# Patient Record
Sex: FEMALE | Race: BLACK OR AFRICAN AMERICAN | NOT HISPANIC OR LATINO | Employment: FULL TIME | URBAN - METROPOLITAN AREA
[De-identification: names, ages, dates, MRNs, and addresses within clinical notes are randomized per-mention and may not be internally consistent; named-entity substitution may affect disease eponyms.]

---

## 2023-03-14 DIAGNOSIS — G43.909 MIGRAINE WITHOUT STATUS MIGRAINOSUS, NOT INTRACTABLE, UNSPECIFIED MIGRAINE TYPE: Primary | ICD-10-CM

## 2023-03-14 RX ORDER — RIZATRIPTAN BENZOATE 10 MG/1
10 TABLET, ORALLY DISINTEGRATING ORAL ONCE AS NEEDED
COMMUNITY
End: 2023-03-14 | Stop reason: SDUPTHER

## 2023-03-19 RX ORDER — RIZATRIPTAN BENZOATE 10 MG/1
10 TABLET, ORALLY DISINTEGRATING ORAL ONCE AS NEEDED
Qty: 12 TABLET | Refills: 0 | Status: SHIPPED | OUTPATIENT
Start: 2023-03-19 | End: 2023-04-17

## 2023-04-17 DIAGNOSIS — G43.909 MIGRAINE WITHOUT STATUS MIGRAINOSUS, NOT INTRACTABLE, UNSPECIFIED MIGRAINE TYPE: ICD-10-CM

## 2023-04-17 RX ORDER — RIZATRIPTAN BENZOATE 10 MG/1
10 TABLET, ORALLY DISINTEGRATING ORAL ONCE AS NEEDED
Qty: 9 TABLET | Refills: 1 | Status: SHIPPED | OUTPATIENT
Start: 2023-04-17 | End: 2023-07-12

## 2023-04-27 PROBLEM — F41.8 DEPRESSION WITH ANXIETY: Status: ACTIVE | Noted: 2023-04-27

## 2023-04-27 PROBLEM — N76.0 BACTERIAL VAGINOSIS: Status: RESOLVED | Noted: 2023-04-27 | Resolved: 2023-04-27

## 2023-04-27 PROBLEM — B96.89 BACTERIAL VAGINOSIS: Status: RESOLVED | Noted: 2023-04-27 | Resolved: 2023-04-27

## 2023-04-27 PROBLEM — D25.9 FIBROID, UTERINE: Status: ACTIVE | Noted: 2023-04-27

## 2023-04-27 PROBLEM — R42 DIZZINESS: Status: ACTIVE | Noted: 2023-04-27

## 2023-04-27 PROBLEM — R30.0 DYSURIA: Status: RESOLVED | Noted: 2023-04-27 | Resolved: 2023-04-27

## 2023-04-27 PROBLEM — L72.9 SKIN CYST: Status: ACTIVE | Noted: 2023-04-27

## 2023-04-27 PROBLEM — R53.83 FATIGUE: Status: ACTIVE | Noted: 2023-04-27

## 2023-04-27 PROBLEM — F98.8 ATTENTION DEFICIT DISORDER: Status: ACTIVE | Noted: 2023-04-27

## 2023-04-27 PROBLEM — L30.9 DERMATITIS: Status: ACTIVE | Noted: 2023-04-27

## 2023-04-27 PROBLEM — J01.90 ACUTE SINUSITIS: Status: RESOLVED | Noted: 2023-04-27 | Resolved: 2023-04-27

## 2023-04-27 PROBLEM — E55.9 VITAMIN D DEFICIENCY: Status: ACTIVE | Noted: 2023-04-27

## 2023-04-27 PROBLEM — N94.6 SEVERE DYSMENORRHEA: Status: ACTIVE | Noted: 2023-04-27

## 2023-04-27 PROBLEM — M25.512 ACUTE PAIN OF LEFT SHOULDER: Status: ACTIVE | Noted: 2023-04-27

## 2023-04-27 PROBLEM — M79.10 GENERALIZED MUSCLE ACHE: Status: ACTIVE | Noted: 2023-04-27

## 2023-04-27 PROBLEM — M54.9 BACK PAIN: Status: ACTIVE | Noted: 2023-04-27

## 2023-04-27 PROBLEM — B37.31 YEAST INFECTION OF THE VAGINA: Status: RESOLVED | Noted: 2023-04-27 | Resolved: 2023-04-27

## 2023-04-27 PROBLEM — N93.9 VAGINAL SPOTTING: Status: ACTIVE | Noted: 2023-04-27

## 2023-04-27 PROBLEM — N92.0 MENORRHAGIA WITH REGULAR CYCLE: Status: ACTIVE | Noted: 2023-04-27

## 2023-04-27 PROBLEM — R10.9 ABDOMINAL PAIN: Status: ACTIVE | Noted: 2023-04-27

## 2023-04-27 PROBLEM — G43.909 MIGRAINE HEADACHE: Status: ACTIVE | Noted: 2023-04-27

## 2023-04-27 PROBLEM — M79.644 PAIN OF FINGER OF RIGHT HAND: Status: ACTIVE | Noted: 2023-04-27

## 2023-04-27 PROBLEM — N89.8 VAGINAL ODOR: Status: RESOLVED | Noted: 2023-04-27 | Resolved: 2023-04-27

## 2023-04-28 ENCOUNTER — OFFICE VISIT (OUTPATIENT)
Dept: PRIMARY CARE | Facility: CLINIC | Age: 38
End: 2023-04-28
Payer: COMMERCIAL

## 2023-04-28 VITALS
SYSTOLIC BLOOD PRESSURE: 120 MMHG | OXYGEN SATURATION: 99 % | HEIGHT: 64 IN | DIASTOLIC BLOOD PRESSURE: 64 MMHG | BODY MASS INDEX: 25.3 KG/M2 | WEIGHT: 148.2 LBS | HEART RATE: 71 BPM

## 2023-04-28 DIAGNOSIS — Z13.29 SCREENING FOR ENDOCRINE, METABOLIC AND IMMUNITY DISORDER: Primary | ICD-10-CM

## 2023-04-28 DIAGNOSIS — G89.29 CHRONIC LOW BACK PAIN, UNSPECIFIED BACK PAIN LATERALITY, UNSPECIFIED WHETHER SCIATICA PRESENT: ICD-10-CM

## 2023-04-28 DIAGNOSIS — Z13.228 SCREENING FOR ENDOCRINE, METABOLIC AND IMMUNITY DISORDER: Primary | ICD-10-CM

## 2023-04-28 DIAGNOSIS — T78.40XD ALLERGY, SUBSEQUENT ENCOUNTER: ICD-10-CM

## 2023-04-28 DIAGNOSIS — M54.50 CHRONIC LOW BACK PAIN, UNSPECIFIED BACK PAIN LATERALITY, UNSPECIFIED WHETHER SCIATICA PRESENT: ICD-10-CM

## 2023-04-28 DIAGNOSIS — G43.909 MIGRAINE WITHOUT STATUS MIGRAINOSUS, NOT INTRACTABLE, UNSPECIFIED MIGRAINE TYPE: ICD-10-CM

## 2023-04-28 DIAGNOSIS — Z13.0 SCREENING FOR ENDOCRINE, METABOLIC AND IMMUNITY DISORDER: Primary | ICD-10-CM

## 2023-04-28 PROBLEM — T78.40XA ALLERGIES: Status: ACTIVE | Noted: 2023-04-28

## 2023-04-28 PROCEDURE — 99214 OFFICE O/P EST MOD 30 MIN: CPT | Performed by: PHYSICIAN ASSISTANT

## 2023-04-28 PROCEDURE — 1036F TOBACCO NON-USER: CPT | Performed by: PHYSICIAN ASSISTANT

## 2023-04-28 RX ORDER — IBUPROFEN 600 MG/1
600 TABLET ORAL EVERY 6 HOURS PRN
COMMUNITY

## 2023-04-28 RX ORDER — LIDOCAINE 50 MG/G
1 PATCH TOPICAL DAILY
Qty: 30 PATCH | Refills: 11 | Status: SHIPPED | OUTPATIENT
Start: 2023-04-28 | End: 2024-04-27

## 2023-04-28 RX ORDER — LORATADINE 10 MG/1
1 TABLET ORAL DAILY
COMMUNITY
Start: 2022-01-12 | End: 2023-04-28 | Stop reason: ALTCHOICE

## 2023-04-28 RX ORDER — SCOLOPAMINE TRANSDERMAL SYSTEM 1 MG/1
1 PATCH, EXTENDED RELEASE TRANSDERMAL
COMMUNITY
Start: 2023-01-06

## 2023-04-28 RX ORDER — BENZOYL PEROXIDE 100 MG/ML
LIQUID TOPICAL
COMMUNITY
Start: 2023-01-06 | End: 2023-09-19 | Stop reason: SDUPTHER

## 2023-04-28 RX ORDER — FLUTICASONE PROPIONATE 50 MCG
1 SPRAY, SUSPENSION (ML) NASAL DAILY
Qty: 16 G | Refills: 5 | Status: SHIPPED | OUTPATIENT
Start: 2023-04-28 | End: 2024-04-27

## 2023-04-28 RX ORDER — LORATADINE 10 MG/1
10 TABLET ORAL DAILY
Qty: 30 TABLET | Refills: 11 | Status: SHIPPED | OUTPATIENT
Start: 2023-04-28 | End: 2023-05-25

## 2023-04-28 RX ORDER — HYDROXYZINE PAMOATE 25 MG/1
25 CAPSULE ORAL 2 TIMES DAILY PRN
COMMUNITY
Start: 2022-10-25

## 2023-04-28 RX ORDER — CYCLOBENZAPRINE HCL 5 MG
5 TABLET ORAL NIGHTLY PRN
Qty: 30 TABLET | Refills: 0 | Status: SHIPPED | OUTPATIENT
Start: 2023-04-28 | End: 2023-07-05

## 2023-04-28 RX ORDER — KETOTIFEN FUMARATE 0.35 MG/ML
1 SOLUTION/ DROPS OPHTHALMIC 2 TIMES DAILY
COMMUNITY
Start: 2022-08-15 | End: 2023-04-28 | Stop reason: ALTCHOICE

## 2023-04-28 RX ORDER — TIZANIDINE 4 MG/1
4 TABLET ORAL NIGHTLY
COMMUNITY

## 2023-04-28 ASSESSMENT — PATIENT HEALTH QUESTIONNAIRE - PHQ9
2. FEELING DOWN, DEPRESSED OR HOPELESS: NOT AT ALL
1. LITTLE INTEREST OR PLEASURE IN DOING THINGS: NOT AT ALL
SUM OF ALL RESPONSES TO PHQ9 QUESTIONS 1 AND 2: 0

## 2023-04-28 NOTE — ASSESSMENT & PLAN NOTE
Continue rizatriptan 10 mg as needed.  Called in prescription for Nurtec to begin instead, pending insurance coverage.  Avoid triggers of migraines. Rest, apply ice packs to head, and go in a dark cool room to relax as needed for relief.

## 2023-04-28 NOTE — ASSESSMENT & PLAN NOTE
Rest, apply ice/heat in 20-minute intervals, use topical lidocaine patches in 12-hour intervals (prescription sent to pharmacy).  Prescription sent for cyclobenzaprine 5 mg to use as needed.  Discussed precautions with use including drowsiness, avoidance of driving, confusion, etc.  Referral to PT and Ortho spine provided.    If any red flags occur including fever, acute worsening of back pain, loss of bowel or bladder function, or numbness and tingling of the groin, go to the ED immediately.

## 2023-04-28 NOTE — ASSESSMENT & PLAN NOTE
Take loratadine 10 mg daily.  Flonase nasal spray to be used 1-2 times daily as needed for rhinorrhea. If persistence of symptoms despite treatment, can trial Azelastine nasal spray.
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
## 2023-05-24 DIAGNOSIS — T78.40XD ALLERGY, SUBSEQUENT ENCOUNTER: ICD-10-CM

## 2023-05-25 RX ORDER — LORATADINE 10 MG/1
TABLET ORAL
Qty: 30 TABLET | Refills: 11 | Status: SHIPPED | OUTPATIENT
Start: 2023-05-25

## 2023-06-29 ENCOUNTER — TELEPHONE (OUTPATIENT)
Dept: PRIMARY CARE | Facility: CLINIC | Age: 38
End: 2023-06-29
Payer: COMMERCIAL

## 2023-06-29 DIAGNOSIS — G89.29 CHRONIC LOW BACK PAIN, UNSPECIFIED BACK PAIN LATERALITY, UNSPECIFIED WHETHER SCIATICA PRESENT: Primary | ICD-10-CM

## 2023-06-29 DIAGNOSIS — M54.50 CHRONIC LOW BACK PAIN, UNSPECIFIED BACK PAIN LATERALITY, UNSPECIFIED WHETHER SCIATICA PRESENT: Primary | ICD-10-CM

## 2023-07-02 DIAGNOSIS — M54.50 CHRONIC LOW BACK PAIN, UNSPECIFIED BACK PAIN LATERALITY, UNSPECIFIED WHETHER SCIATICA PRESENT: ICD-10-CM

## 2023-07-02 DIAGNOSIS — G89.29 CHRONIC LOW BACK PAIN, UNSPECIFIED BACK PAIN LATERALITY, UNSPECIFIED WHETHER SCIATICA PRESENT: ICD-10-CM

## 2023-07-05 RX ORDER — CYCLOBENZAPRINE HCL 5 MG
5 TABLET ORAL NIGHTLY PRN
Qty: 30 TABLET | Refills: 0 | Status: SHIPPED | OUTPATIENT
Start: 2023-07-05 | End: 2024-02-27

## 2023-07-12 DIAGNOSIS — G43.909 MIGRAINE WITHOUT STATUS MIGRAINOSUS, NOT INTRACTABLE, UNSPECIFIED MIGRAINE TYPE: ICD-10-CM

## 2023-07-12 RX ORDER — RIZATRIPTAN BENZOATE 10 MG/1
10 TABLET, ORALLY DISINTEGRATING ORAL ONCE AS NEEDED
Qty: 9 TABLET | Refills: 5 | Status: SHIPPED | OUTPATIENT
Start: 2023-07-12 | End: 2024-02-07

## 2023-09-09 LAB
CHLAMYDIA TRACH., AMPLIFIED: NEGATIVE
N. GONORRHEA, AMPLIFIED: NEGATIVE
TRICHOMONAS VAGINALIS: NEGATIVE

## 2023-09-10 LAB
CLUE CELLS: PRESENT
NUGENT SCORE: 8
YEAST: ABNORMAL

## 2023-09-19 DIAGNOSIS — L30.9 DERMATITIS: Primary | ICD-10-CM

## 2023-09-19 RX ORDER — BENZOYL PEROXIDE 100 MG/ML
LIQUID TOPICAL
Qty: 227 G | Refills: 1 | Status: SHIPPED | OUTPATIENT
Start: 2023-09-19 | End: 2024-02-27

## 2023-10-07 PROBLEM — M79.18 LUMBAR MUSCLE PAIN: Status: ACTIVE | Noted: 2023-10-07

## 2023-10-07 PROBLEM — M54.50 LUMBOSACRAL PAIN: Status: ACTIVE | Noted: 2023-10-07

## 2023-10-07 RX ORDER — METRONIDAZOLE 500 MG/1
500 TABLET ORAL 2 TIMES DAILY
COMMUNITY
End: 2024-02-22

## 2023-10-07 RX ORDER — KETOCONAZOLE 20 MG/G
1 CREAM TOPICAL
COMMUNITY
Start: 2016-03-28

## 2023-10-07 RX ORDER — CYCLOBENZAPRINE HCL 10 MG
10 TABLET ORAL EVERY 8 HOURS PRN
COMMUNITY
Start: 2022-01-15

## 2023-10-07 RX ORDER — FOLIC ACID/MULTIVIT,IRON,MINER 0.4MG-18MG
1 TABLET ORAL DAILY
COMMUNITY
Start: 2019-07-16

## 2023-10-07 RX ORDER — ACETAMINOPHEN 500 MG
1000 TABLET ORAL EVERY 6 HOURS PRN
COMMUNITY
Start: 2020-06-24

## 2023-10-07 RX ORDER — KETOCONAZOLE 20 MG/ML
1 SHAMPOO, SUSPENSION TOPICAL
COMMUNITY
Start: 2016-03-28

## 2023-10-07 RX ORDER — SERTRALINE HYDROCHLORIDE 100 MG/1
100 TABLET, FILM COATED ORAL DAILY
COMMUNITY

## 2023-10-07 RX ORDER — KETOTIFEN FUMARATE 0.35 MG/ML
1 SOLUTION/ DROPS OPHTHALMIC 2 TIMES DAILY PRN
COMMUNITY
Start: 2022-08-15

## 2023-10-07 RX ORDER — AZITHROMYCIN 250 MG/1
250 TABLET, FILM COATED ORAL
COMMUNITY

## 2023-10-07 RX ORDER — DOCUSATE SODIUM 100 MG/1
100 CAPSULE, LIQUID FILLED ORAL 2 TIMES DAILY
COMMUNITY
Start: 2020-06-24

## 2023-10-07 RX ORDER — AMOXAPINE 50 MG/1
40 TABLET ORAL
COMMUNITY

## 2023-10-07 RX ORDER — TRETINOIN 0.5 MG/G
CREAM TOPICAL NIGHTLY
COMMUNITY
Start: 2023-01-06

## 2023-10-07 RX ORDER — DOXYCYCLINE 100 MG/1
1 TABLET ORAL EVERY 12 HOURS
COMMUNITY
Start: 2022-11-29

## 2023-10-20 ENCOUNTER — OFFICE VISIT (OUTPATIENT)
Dept: PRIMARY CARE | Facility: CLINIC | Age: 38
End: 2023-10-20
Payer: COMMERCIAL

## 2023-10-20 VITALS
SYSTOLIC BLOOD PRESSURE: 110 MMHG | WEIGHT: 153 LBS | HEART RATE: 73 BPM | OXYGEN SATURATION: 96 % | BODY MASS INDEX: 26.26 KG/M2 | DIASTOLIC BLOOD PRESSURE: 74 MMHG

## 2023-10-20 DIAGNOSIS — G43.909 MIGRAINE WITHOUT STATUS MIGRAINOSUS, NOT INTRACTABLE, UNSPECIFIED MIGRAINE TYPE: Primary | ICD-10-CM

## 2023-10-20 DIAGNOSIS — B00.9 HERPES SIMPLEX: ICD-10-CM

## 2023-10-20 DIAGNOSIS — G89.29 CHRONIC LOW BACK PAIN, UNSPECIFIED BACK PAIN LATERALITY, UNSPECIFIED WHETHER SCIATICA PRESENT: ICD-10-CM

## 2023-10-20 DIAGNOSIS — M54.50 CHRONIC LOW BACK PAIN, UNSPECIFIED BACK PAIN LATERALITY, UNSPECIFIED WHETHER SCIATICA PRESENT: ICD-10-CM

## 2023-10-20 PROCEDURE — 99214 OFFICE O/P EST MOD 30 MIN: CPT | Performed by: PHYSICIAN ASSISTANT

## 2023-10-20 PROCEDURE — 1036F TOBACCO NON-USER: CPT | Performed by: PHYSICIAN ASSISTANT

## 2023-10-20 ASSESSMENT — PATIENT HEALTH QUESTIONNAIRE - PHQ9
SUM OF ALL RESPONSES TO PHQ9 QUESTIONS 1 AND 2: 0
2. FEELING DOWN, DEPRESSED OR HOPELESS: NOT AT ALL
1. LITTLE INTEREST OR PLEASURE IN DOING THINGS: NOT AT ALL
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
## 2023-10-23 RX ORDER — ACYCLOVIR 400 MG/1
400 TABLET ORAL 3 TIMES DAILY PRN
Qty: 21 TABLET | Refills: 1 | Status: SHIPPED | OUTPATIENT
Start: 2023-10-23 | End: 2023-11-06

## 2023-10-24 PROBLEM — B00.9 HERPES SIMPLEX: Status: ACTIVE | Noted: 2023-10-24

## 2023-10-24 RX ORDER — ZAVEGEPANT 10 MG/.1ML
1 SPRAY NASAL ONCE AS NEEDED
Qty: 8 EACH | Refills: 2 | Status: SHIPPED | OUTPATIENT
Start: 2023-10-24

## 2023-10-24 NOTE — ASSESSMENT & PLAN NOTE
Rest, apply ice/heat in 20-minute intervals, use topical lidocaine patches in 12-hour intervals. If any red flags occur including fever, acute worsening of back pain, loss of bowel or bladder function, or numbness and tingling of the groin, go to the ED immediately.

## 2023-10-24 NOTE — ASSESSMENT & PLAN NOTE
Sample of Nurtec administered in office for acute migraine.  Prescription for Zavzpret nasal spray sent to the pharmacy to use for acute migraine.  Please read the instructions for use prior to using. There is no priming of the nasal spray needed.  Do not tilt your head or lay down when taking.  Do not take a deep breath when delivering the spray.  Most common side effects are a brief poor taste, nausea or vomiting, and nasal discomfort

## 2023-10-27 ENCOUNTER — APPOINTMENT (OUTPATIENT)
Dept: PRIMARY CARE | Facility: CLINIC | Age: 38
End: 2023-10-27
Payer: COMMERCIAL

## 2023-11-06 ENCOUNTER — TELEPHONE (OUTPATIENT)
Dept: OBSTETRICS AND GYNECOLOGY | Facility: CLINIC | Age: 38
End: 2023-11-06

## 2023-11-06 ENCOUNTER — CLINICAL SUPPORT (OUTPATIENT)
Dept: OBSTETRICS AND GYNECOLOGY | Facility: CLINIC | Age: 38
End: 2023-11-06
Payer: COMMERCIAL

## 2023-11-06 DIAGNOSIS — N89.8 VAGINAL ITCHING: ICD-10-CM

## 2023-11-06 PROCEDURE — 87205 SMEAR GRAM STAIN: CPT

## 2023-11-06 NOTE — TELEPHONE ENCOUNTER
contacted pt  name and  verified  Was a pt of [facility] last seen 2023.   Yeast infection symptoms.   Request for Self swab to [facility].  Pt aware will place on nurse schedule for testing.  pt verbalized understanding  no further questions or concerns at this time

## 2023-11-07 LAB
CLUE CELLS VAG LPF-#/AREA: NORMAL /[LPF]
NUGENT SCORE: 1
YEAST VAG WET PREP-#/AREA: NORMAL

## 2023-11-17 ENCOUNTER — LAB (OUTPATIENT)
Dept: LAB | Facility: LAB | Age: 38
End: 2023-11-17
Payer: COMMERCIAL

## 2023-11-17 DIAGNOSIS — Z13.0 SCREENING FOR ENDOCRINE, METABOLIC AND IMMUNITY DISORDER: ICD-10-CM

## 2023-11-17 DIAGNOSIS — Z13.228 SCREENING FOR ENDOCRINE, METABOLIC AND IMMUNITY DISORDER: ICD-10-CM

## 2023-11-17 DIAGNOSIS — Z13.29 SCREENING FOR ENDOCRINE, METABOLIC AND IMMUNITY DISORDER: ICD-10-CM

## 2023-11-17 LAB
HBV SURFACE AB SER-ACNC: 32.7 MIU/ML
MUMPS IGG ANTIBODY INDEX: 2.1 IA
MUV IGG SER IA-ACNC: POSITIVE
VARICELLA ZOSTER IGG INDEX: 0.7 IA
VZV IGG SER QL IA: NEGATIVE

## 2023-11-17 PROCEDURE — 86735 MUMPS ANTIBODY: CPT

## 2023-11-17 PROCEDURE — 86787 VARICELLA-ZOSTER ANTIBODY: CPT

## 2023-11-17 PROCEDURE — 86706 HEP B SURFACE ANTIBODY: CPT

## 2023-11-17 PROCEDURE — 36415 COLL VENOUS BLD VENIPUNCTURE: CPT

## 2023-11-29 NOTE — RESULT ENCOUNTER NOTE
Titers for mumps and hepatitis B were positive. Varicella antibodies were negative. I recommend varicella vaccination from the local health department, we do not carry this one in office.

## 2023-11-30 DIAGNOSIS — Z12.31 ENCOUNTER FOR SCREENING MAMMOGRAM FOR BREAST CANCER: Primary | ICD-10-CM

## 2023-12-07 ENCOUNTER — APPOINTMENT (OUTPATIENT)
Dept: RADIOLOGY | Facility: CLINIC | Age: 38
End: 2023-12-07
Payer: COMMERCIAL

## 2023-12-21 DIAGNOSIS — M54.50 CHRONIC LOW BACK PAIN, UNSPECIFIED BACK PAIN LATERALITY, UNSPECIFIED WHETHER SCIATICA PRESENT: Primary | ICD-10-CM

## 2023-12-21 DIAGNOSIS — G89.29 CHRONIC LOW BACK PAIN, UNSPECIFIED BACK PAIN LATERALITY, UNSPECIFIED WHETHER SCIATICA PRESENT: Primary | ICD-10-CM

## 2024-01-03 ENCOUNTER — APPOINTMENT (OUTPATIENT)
Dept: RADIOLOGY | Facility: CLINIC | Age: 39
End: 2024-01-03
Payer: COMMERCIAL
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
## 2024-01-17 ENCOUNTER — APPOINTMENT (OUTPATIENT)
Dept: RADIOLOGY | Facility: CLINIC | Age: 39
End: 2024-01-17
Payer: COMMERCIAL

## 2024-01-17 ENCOUNTER — ANCILLARY PROCEDURE (OUTPATIENT)
Dept: RADIOLOGY | Facility: CLINIC | Age: 39
End: 2024-01-17
Payer: COMMERCIAL

## 2024-01-17 ENCOUNTER — EVALUATION (OUTPATIENT)
Dept: PHYSICAL THERAPY | Facility: CLINIC | Age: 39
End: 2024-01-17
Payer: COMMERCIAL

## 2024-01-17 VITALS — HEIGHT: 64 IN | BODY MASS INDEX: 26.12 KG/M2 | WEIGHT: 153 LBS

## 2024-01-17 DIAGNOSIS — G89.29 CHRONIC LOW BACK PAIN, UNSPECIFIED BACK PAIN LATERALITY, UNSPECIFIED WHETHER SCIATICA PRESENT: ICD-10-CM

## 2024-01-17 DIAGNOSIS — Z12.31 ENCOUNTER FOR SCREENING MAMMOGRAM FOR BREAST CANCER: ICD-10-CM

## 2024-01-17 DIAGNOSIS — M54.50 CHRONIC LOW BACK PAIN, UNSPECIFIED BACK PAIN LATERALITY, UNSPECIFIED WHETHER SCIATICA PRESENT: ICD-10-CM

## 2024-01-17 PROCEDURE — 97161 PT EVAL LOW COMPLEX 20 MIN: CPT | Mod: GP

## 2024-01-17 PROCEDURE — 97110 THERAPEUTIC EXERCISES: CPT | Mod: GP

## 2024-01-17 PROCEDURE — 77067 SCR MAMMO BI INCL CAD: CPT | Performed by: RADIOLOGY

## 2024-01-17 PROCEDURE — 77063 BREAST TOMOSYNTHESIS BI: CPT | Performed by: RADIOLOGY

## 2024-01-17 PROCEDURE — 77067 SCR MAMMO BI INCL CAD: CPT

## 2024-01-23 ENCOUNTER — APPOINTMENT (OUTPATIENT)
Dept: PHYSICAL THERAPY | Facility: CLINIC | Age: 39
End: 2024-01-23
Payer: COMMERCIAL

## 2024-01-28 NOTE — PROGRESS NOTES
"Assessment/Plan   Diagnoses and all orders for this visit:  Vaginal irritation  -     C. Trachomatis / N. Gonorrhoeae, Amplified Detection  -     Trichomonas vaginalis, Nucleic Acid Detection  -     HSV PCR, Skin/Mucosa  -     Vaginitis Gram Stain For Bacterial Vaginosis + Yeast  Suprapubic pain  -     Urine culture    Follow up pending results.    Ashley Palmattuck, APRN-CNM, APRN-CNP      Dc Ji is a 38 y.o. female who presents for vaginal irritation.     HPI    Patient reports symptoms of vaginal dryness and abdominal pain since December. She states she initially presented to [facility], was treated for a yeast infection, treated with fluconazole x3 tablets with only slight resolution in symptoms. GC/CT/trich negative at that time. She then returned to [facility] and was empirically treated for BV with Metrogel while awaiting results for mycoplasma/ureaplasma off of her urine. She reports she was told her results came back \"inconclusive\" for BV and + for ureaplasma, and is now taking oral Flagyl and doxycycline (reports having about 3 days left). Reports lower abdominal pain has continued. Patient reports remote h/o of STI and yeast/BV. She does have a h/o oral cold sores for which she treats with abreva or acyclovir. She denies vaginal discharge, itching, foul odor, irregular bleeding.     Menstrual History:  OB History          4    Para   2    Term   2            AB   2    Living   2         SAB        IAB        Ectopic        Multiple        Live Births                   No LMP recorded.       Objective   /78   Ht 1.626 m (5' 4\")   Wt 68.3 kg (150 lb 9.6 oz)   BMI 25.85 kg/m²     Physical Exam  Constitutional:       Appearance: Normal appearance.   Genitourinary:      No lesions in the vagina.      Right Labia: No rash, tenderness, lesions or skin changes.     Left Labia: No tenderness, lesions, skin changes or rash.     No vaginal discharge, " erythema, tenderness or bleeding.      Cervical lesion present.      No cervical discharge or friability.         Pulmonary:      Effort: Pulmonary effort is normal.   Abdominal:      General: Abdomen is flat.      Palpations: Abdomen is soft.      Tenderness: There is no abdominal tenderness. There is no right CVA tenderness or left CVA tenderness.   Neurological:      General: No focal deficit present.      Mental Status: She is alert and oriented to person, place, and time. Mental status is at baseline.   Skin:     General: Skin is warm and dry.   Psychiatric:         Mood and Affect: Mood normal.         Behavior: Behavior normal.         Thought Content: Thought content normal.         Judgment: Judgment normal.

## 2024-01-29 ENCOUNTER — OFFICE VISIT (OUTPATIENT)
Dept: OBSTETRICS AND GYNECOLOGY | Facility: CLINIC | Age: 39
End: 2024-01-29
Payer: COMMERCIAL

## 2024-01-29 VITALS
HEIGHT: 64 IN | WEIGHT: 150.6 LBS | SYSTOLIC BLOOD PRESSURE: 126 MMHG | DIASTOLIC BLOOD PRESSURE: 78 MMHG | BODY MASS INDEX: 25.71 KG/M2

## 2024-01-29 DIAGNOSIS — N89.8 VAGINAL IRRITATION: Primary | ICD-10-CM

## 2024-01-29 DIAGNOSIS — R10.2 SUPRAPUBIC PAIN: ICD-10-CM

## 2024-01-29 PROCEDURE — 87529 HSV DNA AMP PROBE: CPT

## 2024-01-29 PROCEDURE — 99213 OFFICE O/P EST LOW 20 MIN: CPT | Performed by: NURSE PRACTITIONER

## 2024-01-29 PROCEDURE — 87205 SMEAR GRAM STAIN: CPT

## 2024-01-29 PROCEDURE — 87086 URINE CULTURE/COLONY COUNT: CPT

## 2024-01-29 PROCEDURE — 1036F TOBACCO NON-USER: CPT | Performed by: NURSE PRACTITIONER

## 2024-01-29 PROCEDURE — 87800 DETECT AGNT MULT DNA DIREC: CPT

## 2024-01-29 PROCEDURE — 87661 TRICHOMONAS VAGINALIS AMPLIF: CPT

## 2024-01-29 ASSESSMENT — ENCOUNTER SYMPTOMS
RESPIRATORY NEGATIVE: 0
GASTROINTESTINAL NEGATIVE: 0
ALLERGIC/IMMUNOLOGIC NEGATIVE: 0
CARDIOVASCULAR NEGATIVE: 0
EYES NEGATIVE: 0
PSYCHIATRIC NEGATIVE: 0
HEMATOLOGIC/LYMPHATIC NEGATIVE: 0
CONSTITUTIONAL NEGATIVE: 0
ENDOCRINE NEGATIVE: 0
NEUROLOGICAL NEGATIVE: 0
MUSCULOSKELETAL NEGATIVE: 0

## 2024-01-29 ASSESSMENT — PAIN SCALES - GENERAL: PAINLEVEL: 0-NO PAIN

## 2024-01-30 LAB
BACTERIA UR CULT: NORMAL
C TRACH RRNA SPEC QL NAA+PROBE: NEGATIVE
CLUE CELLS VAG LPF-#/AREA: NORMAL /[LPF]
HSV1 DNA SKIN QL NAA+PROBE: NOT DETECTED
HSV2 DNA SKIN QL NAA+PROBE: NOT DETECTED
N GONORRHOEA DNA SPEC QL PROBE+SIG AMP: NEGATIVE
NUGENT SCORE: 0
T VAGINALIS RRNA SPEC QL NAA+PROBE: NEGATIVE
YEAST VAG WET PREP-#/AREA: NORMAL

## 2024-02-06 DIAGNOSIS — G43.909 MIGRAINE WITHOUT STATUS MIGRAINOSUS, NOT INTRACTABLE, UNSPECIFIED MIGRAINE TYPE: ICD-10-CM

## 2024-02-07 RX ORDER — RIZATRIPTAN BENZOATE 10 MG/1
10 TABLET, ORALLY DISINTEGRATING ORAL ONCE AS NEEDED
Qty: 9 TABLET | Refills: 5 | Status: SHIPPED | OUTPATIENT
Start: 2024-02-07 | End: 2024-02-26 | Stop reason: SDUPTHER

## 2024-02-12 ENCOUNTER — TELEPHONE (OUTPATIENT)
Dept: OBSTETRICS AND GYNECOLOGY | Facility: CLINIC | Age: 39
End: 2024-02-12
Payer: COMMERCIAL

## 2024-02-12 NOTE — TELEPHONE ENCOUNTER
Called pt, no answer, left voicemail for return call  Will keep appts as scheduled can cancel if all needs are met at earlier appt

## 2024-02-19 ENCOUNTER — OFFICE VISIT (OUTPATIENT)
Dept: OBSTETRICS AND GYNECOLOGY | Facility: CLINIC | Age: 39
End: 2024-02-19
Payer: COMMERCIAL

## 2024-02-19 VITALS
BODY MASS INDEX: 26.8 KG/M2 | SYSTOLIC BLOOD PRESSURE: 140 MMHG | DIASTOLIC BLOOD PRESSURE: 88 MMHG | HEIGHT: 64 IN | WEIGHT: 157 LBS

## 2024-02-19 DIAGNOSIS — Z12.4 CERVICAL CANCER SCREENING: ICD-10-CM

## 2024-02-19 DIAGNOSIS — N76.0 ACUTE VAGINITIS: Primary | ICD-10-CM

## 2024-02-19 PROCEDURE — 1036F TOBACCO NON-USER: CPT

## 2024-02-19 PROCEDURE — 99213 OFFICE O/P EST LOW 20 MIN: CPT

## 2024-02-19 PROCEDURE — 87205 SMEAR GRAM STAIN: CPT

## 2024-02-19 PROCEDURE — 88175 CYTOPATH C/V AUTO FLUID REDO: CPT

## 2024-02-19 NOTE — PROGRESS NOTES
"Assessment/Plan   Diagnoses and all orders for this visit:  Acute vaginitis  -     Mycoplasma Genitalium with macrolide resistance testing; [facility]; [id number] - Miscellaneous Test; Future  -     Vaginitis Gram Stain For Bacterial Vaginosis + Yeast  Cervical cancer screening  -     THINPREP PAP    Discussed finishing current treatment and treating testing that results from today.   Encouraged patient to follow up with a singular provider in order to have more continuity of care.   Encouraged to follow up as needed and with annual woman's exam.   Encouraged to reach out to our office with any questions or concerns.       JOSHUA Martines-BETINA     Subjective   Jazz Ji is a 38 y.o. female who is here for a problem visit    Concerns today:  Patient reports symptoms of vaginal dryness and abdominal pain since December.   She has been seen multiple times for this concern:     1. She initially presented to [facility] in January.  -  treated for a yeast infection, treated with fluconazole x3 tablets. This resolved her symptoms slightly.   - GC/CT/trich negative at that time.   2. She then returned to [facility]   - Empirically treated for BV with Metrogel while awaiting results for mycoplasma/ureaplasma off of her urine.   -She reports  her results came back \"inconclusive\" for BV and + for ureaplasma, and was prescribed oral Flagyl and doxycycline (reports having about 3 days left).   3. Went to see Ashley Hernandez on 23   -Labs normal at that time.   4. [facility]   - Given 1 dose of fluconozole. Levoflaxin 500mg started Saturday.        Patient reports remote h/o of STI and yeast/BV. She does have a h/o oral cold sores for which she treats with abreva or acyclovir. She denies vaginal discharge, itching, foul odor, irregular bleeding.        Patient's last menstrual period was 2024 (approximate).     Menstrual History:  OB History          4    Para   2 " "   Term   2            AB   2    Living   2         SAB        IAB        Ectopic        Multiple        Live Births                  Patient's last menstrual period was 2024 (approximate).       Objective   /88   Ht 1.626 m (5' 4\")   Wt 71.2 kg (157 lb)   LMP 2024 (Approximate)   BMI 26.95 kg/m²   Physical Exam  Constitutional:       Appearance: Normal appearance.   Genitourinary:      Vulva and rectum normal.      No lesions in the vagina.      Right Labia: No rash, tenderness, lesions, skin changes or Bartholin's cyst.     Left Labia: No tenderness, lesions, skin changes, Bartholin's cyst or rash.     No labial fusion noted.      No inguinal adenopathy present in the right or left side.     No vaginal discharge, erythema, tenderness or bleeding.      No vaginal prolapse present.     No vaginal atrophy present.       Right Adnexa: not tender and no mass present.     Left Adnexa: not tender and no mass present.     Cervical lesion present.      No cervical motion tenderness, discharge or friability.            Uterus is not enlarged or tender.      No uterine mass detected.  Pulmonary:      Effort: Pulmonary effort is normal.   Abdominal:      General: Abdomen is flat. There is no distension.      Palpations: Abdomen is soft. There is no mass.      Tenderness: There is no abdominal tenderness. There is no right CVA tenderness, left CVA tenderness, guarding or rebound.      Hernia: There is no hernia in the left inguinal area or right inguinal area.   Lymphadenopathy:      Lower Body: No right inguinal adenopathy. No left inguinal adenopathy.   Neurological:      Mental Status: She is alert and oriented to person, place, and time.   Skin:     General: Skin is warm and dry.   Psychiatric:         Mood and Affect: Mood normal.         Behavior: Behavior normal.         Thought Content: Thought content normal.         Judgment: Judgment normal.        "

## 2024-02-20 LAB
CLUE CELLS VAG LPF-#/AREA: PRESENT /[LPF]
NUGENT SCORE: 7
YEAST VAG WET PREP-#/AREA: ABNORMAL

## 2024-02-22 ENCOUNTER — TELEPHONE (OUTPATIENT)
Dept: OBSTETRICS AND GYNECOLOGY | Facility: CLINIC | Age: 39
End: 2024-02-22
Payer: COMMERCIAL

## 2024-02-22 DIAGNOSIS — N76.0 BV (BACTERIAL VAGINOSIS): Primary | ICD-10-CM

## 2024-02-22 DIAGNOSIS — B96.89 BV (BACTERIAL VAGINOSIS): Primary | ICD-10-CM

## 2024-02-22 RX ORDER — METRONIDAZOLE 7.5 MG/G
GEL VAGINAL DAILY
Qty: 70 G | Refills: 0 | Status: SHIPPED | OUTPATIENT
Start: 2024-02-22 | End: 2024-02-27

## 2024-02-22 NOTE — TELEPHONE ENCOUNTER
Result Communication    Resulted Orders   Vaginitis Gram Stain For Bacterial Vaginosis + Yeast   Result Value Ref Range    Trish Score 7 (H) 0 - 3      Comment:      Interpretation of the Trish Score  0-3.....Normal vaginal microbiota  4-6.....Intermediate results  7-10....Bacterial vaginosis    Yeast ABSENT ABSENT    Clue Cells PRESENT (A) ABSENT       12:40 PM      Results were successfully communicated with the patient and they acknowledged their understanding.  Discussed a lot of antibiotic use. Plan to treat with Metrogel for 5 days. Order placed. Patient reports understanding and is in agreement to plan.     Encouraged to reach out to our office with any questions or concerns.     QUYNH Martines

## 2024-02-25 DIAGNOSIS — M54.50 CHRONIC LOW BACK PAIN, UNSPECIFIED BACK PAIN LATERALITY, UNSPECIFIED WHETHER SCIATICA PRESENT: ICD-10-CM

## 2024-02-25 DIAGNOSIS — G89.29 CHRONIC LOW BACK PAIN, UNSPECIFIED BACK PAIN LATERALITY, UNSPECIFIED WHETHER SCIATICA PRESENT: ICD-10-CM

## 2024-02-25 DIAGNOSIS — L30.9 DERMATITIS: ICD-10-CM

## 2024-02-26 ENCOUNTER — APPOINTMENT (OUTPATIENT)
Dept: OBSTETRICS AND GYNECOLOGY | Facility: CLINIC | Age: 39
End: 2024-02-26
Payer: COMMERCIAL

## 2024-02-26 DIAGNOSIS — Z13.29 SCREENING FOR ENDOCRINE, METABOLIC AND IMMUNITY DISORDER: Primary | ICD-10-CM

## 2024-02-26 DIAGNOSIS — G43.909 MIGRAINE WITHOUT STATUS MIGRAINOSUS, NOT INTRACTABLE, UNSPECIFIED MIGRAINE TYPE: ICD-10-CM

## 2024-02-26 DIAGNOSIS — Z13.228 SCREENING FOR ENDOCRINE, METABOLIC AND IMMUNITY DISORDER: Primary | ICD-10-CM

## 2024-02-26 DIAGNOSIS — Z13.0 SCREENING FOR ENDOCRINE, METABOLIC AND IMMUNITY DISORDER: Primary | ICD-10-CM

## 2024-02-26 LAB — SCAN RESULT: NORMAL

## 2024-02-26 RX ORDER — RIZATRIPTAN BENZOATE 10 MG/1
10 TABLET, ORALLY DISINTEGRATING ORAL ONCE AS NEEDED
Qty: 9 TABLET | Refills: 8 | Status: SHIPPED | OUTPATIENT
Start: 2024-02-26

## 2024-02-27 RX ORDER — CYCLOBENZAPRINE HCL 5 MG
5 TABLET ORAL NIGHTLY PRN
Qty: 30 TABLET | Refills: 2 | Status: SHIPPED | OUTPATIENT
Start: 2024-02-27 | End: 2024-05-27

## 2024-02-27 RX ORDER — BENZOYL PEROXIDE 100 MG/ML
LIQUID TOPICAL
Qty: 237 ML | Refills: 2 | Status: SHIPPED | OUTPATIENT
Start: 2024-02-27

## 2024-02-28 LAB
CYTOLOGY CMNT CVX/VAG CYTO-IMP: NORMAL
LAB AP HPV GENOTYPE QUESTION: YES
LAB AP HPV HR: NORMAL
LABORATORY COMMENT REPORT: NORMAL
LMP START DATE: NORMAL
PATH REPORT.TOTAL CANCER: NORMAL

## 2024-03-04 ENCOUNTER — OFFICE VISIT (OUTPATIENT)
Dept: OBSTETRICS AND GYNECOLOGY | Facility: CLINIC | Age: 39
End: 2024-03-04
Payer: COMMERCIAL

## 2024-03-04 ENCOUNTER — LAB (OUTPATIENT)
Dept: LAB | Facility: LAB | Age: 39
End: 2024-03-04
Payer: COMMERCIAL

## 2024-03-04 VITALS
DIASTOLIC BLOOD PRESSURE: 94 MMHG | HEIGHT: 64 IN | SYSTOLIC BLOOD PRESSURE: 130 MMHG | BODY MASS INDEX: 26.8 KG/M2 | WEIGHT: 157 LBS

## 2024-03-04 DIAGNOSIS — N76.0 ACUTE VAGINITIS: Primary | ICD-10-CM

## 2024-03-04 DIAGNOSIS — Z13.29 SCREENING FOR ENDOCRINE, METABOLIC AND IMMUNITY DISORDER: ICD-10-CM

## 2024-03-04 DIAGNOSIS — Z13.228 SCREENING FOR ENDOCRINE, METABOLIC AND IMMUNITY DISORDER: ICD-10-CM

## 2024-03-04 DIAGNOSIS — Z13.0 SCREENING FOR ENDOCRINE, METABOLIC AND IMMUNITY DISORDER: ICD-10-CM

## 2024-03-04 PROCEDURE — 86765 RUBEOLA ANTIBODY: CPT

## 2024-03-04 PROCEDURE — 86317 IMMUNOASSAY INFECTIOUS AGENT: CPT

## 2024-03-04 PROCEDURE — 1036F TOBACCO NON-USER: CPT | Performed by: NURSE PRACTITIONER

## 2024-03-04 PROCEDURE — 86706 HEP B SURFACE ANTIBODY: CPT

## 2024-03-04 PROCEDURE — 99213 OFFICE O/P EST LOW 20 MIN: CPT | Performed by: NURSE PRACTITIONER

## 2024-03-04 PROCEDURE — 86787 VARICELLA-ZOSTER ANTIBODY: CPT

## 2024-03-04 PROCEDURE — 36415 COLL VENOUS BLD VENIPUNCTURE: CPT

## 2024-03-04 PROCEDURE — 87205 SMEAR GRAM STAIN: CPT

## 2024-03-04 PROCEDURE — 86735 MUMPS ANTIBODY: CPT

## 2024-03-04 NOTE — PROGRESS NOTES
"Assessment/Plan   Diagnoses and all orders for this visit:  Acute vaginitis  -     Vaginitis Gram Stain For Bacterial Vaginosis + Yeast        -     Good Clean Love samples and and [place] info provided to patient        -     Discussed trial of boric acid 600mg x1 prophylactically following intercourse    Follow up if symptoms worsen or fail to improve.    Ashley SOTO Mary, APRN-CNM, APRN-CNP    Subjective   Jazz Ji is a 38 y.o. female who presents for follow up from recent BV treatment.    HPI    Patient following up for TULIO of vaginitis. She was treated for yeast/BV in January, and then treated again for BV in February. Desires to retest today, continues to experience vaginal dryness, feels symptoms are sometimes triggered after intercourse. Denies vaginal discharge, itching, foul odor, dyspareunia.     Menstrual History:  OB History          4    Para   2    Term   2            AB   2    Living   2         SAB        IAB        Ectopic        Multiple        Live Births                    Patient's last menstrual period was 2024 (exact date).     Objective   BP (!) 130/94   Ht 1.626 m (5' 4\")   Wt 71.2 kg (157 lb)   LMP 2024 (Exact Date)   BMI 26.95 kg/m²     Physical Exam  Constitutional:       Appearance: Normal appearance.   Genitourinary:      Vulva normal.      No lesions in the vagina.      Right Labia: No rash, tenderness or lesions.     Left Labia: No tenderness, lesions or rash.     No vaginal discharge, erythema, tenderness or bleeding.   Pulmonary:      Effort: Pulmonary effort is normal.   Abdominal:      Palpations: Abdomen is soft.      Tenderness: There is no abdominal tenderness.   Neurological:      General: No focal deficit present.      Mental Status: She is alert and oriented to person, place, and time. Mental status is at baseline.   Skin:     General: Skin is warm and dry.   Psychiatric:         Mood and Affect: Mood normal.         Behavior: Behavior " normal.         Thought Content: Thought content normal.         Judgment: Judgment normal.

## 2024-03-05 LAB
BACTERIAL VAGINOSIS VAG-IMP: NORMAL
CLUE CELLS VAG LPF-#/AREA: NORMAL /[LPF]
HBV SURFACE AB SER-ACNC: 33.4 MIU/ML
MEV IGG SER QL IA: POSITIVE
MUMPS IGG ANTIBODY INDEX: 2.2 IA
MUV IGG SER IA-ACNC: POSITIVE
NUGENT SCORE: 4
RUBEOLA IGG ANTIBODY INDEX: 1.3 IA
RUBV IGG SERPL IA-ACNC: 1.4 IA
RUBV IGG SERPL QL IA: POSITIVE
VARICELLA ZOSTER IGG INDEX: 0.8 IA
VZV IGG SER QL IA: NEGATIVE
YEAST VAG WET PREP-#/AREA: NORMAL

## 2024-03-06 NOTE — RESULT ENCOUNTER NOTE
No immunity to varicella, vaccination is recommended. Please get at local pharmacy.     Immunity to measles, mumps and rubella are present.

## 2024-03-21 ENCOUNTER — APPOINTMENT (OUTPATIENT)
Dept: OBSTETRICS AND GYNECOLOGY | Facility: CLINIC | Age: 39
End: 2024-03-21
Payer: COMMERCIAL

## 2024-03-22 DIAGNOSIS — N89.8 VAGINAL DRYNESS: Primary | ICD-10-CM

## 2024-03-22 RX ORDER — ESTRADIOL 0.1 MG/G
2 CREAM VAGINAL NIGHTLY
Qty: 34 G | Refills: 3 | Status: SHIPPED | OUTPATIENT
Start: 2024-03-22 | End: 2025-03-22

## 2024-04-22 ENCOUNTER — APPOINTMENT (OUTPATIENT)
Dept: OBSTETRICS AND GYNECOLOGY | Facility: CLINIC | Age: 39
End: 2024-04-22
Payer: COMMERCIAL

## 2024-04-22 ENCOUNTER — TELEPHONE (OUTPATIENT)
Dept: OBSTETRICS AND GYNECOLOGY | Facility: CLINIC | Age: 39
End: 2024-04-22

## 2024-04-22 NOTE — TELEPHONE ENCOUNTER
Pt verified by name and .  Pt calling states she has slight vaginal odor since today.  Pt denies any discharge.  Pt is aware she can do self swab on 2024 at 2:00 Pm  Pt will take that appt.

## 2024-04-24 ENCOUNTER — CLINICAL SUPPORT (OUTPATIENT)
Dept: OBSTETRICS AND GYNECOLOGY | Facility: CLINIC | Age: 39
End: 2024-04-24
Payer: COMMERCIAL

## 2024-04-24 DIAGNOSIS — N76.0 ACUTE VAGINITIS: ICD-10-CM

## 2024-04-24 PROCEDURE — 87205 SMEAR GRAM STAIN: CPT

## 2024-04-24 NOTE — PROGRESS NOTES
Pt in for self swab for vaginal irritation, swab completed and results are pending    Luz QUEZADA

## 2024-04-25 LAB
BACTERIAL VAGINOSIS VAG-IMP: NORMAL
CLUE CELLS VAG LPF-#/AREA: NORMAL /[LPF]
NUGENT SCORE: 4
YEAST VAG WET PREP-#/AREA: NORMAL

## 2024-04-26 ENCOUNTER — TELEPHONE (OUTPATIENT)
Dept: OBSTETRICS AND GYNECOLOGY | Facility: CLINIC | Age: 39
End: 2024-04-26
Payer: COMMERCIAL

## 2024-04-26 NOTE — TELEPHONE ENCOUNTER
----- Message from Ailyn Cooley MD sent at 4/26/2024  1:03 AM EDT -----  Intermediate results, patient may opt for treatment - please contact patient to then pend and send preference.

## 2024-04-29 ENCOUNTER — TELEPHONE (OUTPATIENT)
Dept: OBSTETRICS AND GYNECOLOGY | Facility: CLINIC | Age: 39
End: 2024-04-29
Payer: COMMERCIAL

## 2024-04-29 DIAGNOSIS — N76.0 BACTERIAL VAGINOSIS: ICD-10-CM

## 2024-04-29 DIAGNOSIS — B96.89 BACTERIAL VAGINOSIS: ICD-10-CM

## 2024-04-29 RX ORDER — METRONIDAZOLE 500 MG/1
500 TABLET ORAL 2 TIMES DAILY
Qty: 14 TABLET | Refills: 0 | Status: SHIPPED | OUTPATIENT
Start: 2024-04-29 | End: 2024-05-06

## 2024-04-29 NOTE — TELEPHONE ENCOUNTER
Call placed to patient to discuss test results.  Patient identified by name and . Patient informed her test results came back +bacterial vaginosis.  Patient educated that bacterial vaginosis is not a STD but is a result of an imbalance of “good” and “harmful” bacteria in a vagina  Douching, not using condoms and having new or multiple sex partners can upset the normal balance of vaginal bacteria, increasing your risk for getting bacterial vaginosis  Patient informed the treatment for bacterial vaginosis is metronidazole, one pill taken twice a day for seven days (or metrogel, etc).  Women should be advised to refrain from sexual activity or to use condoms consistently and correctly during the bacterial vaginosis treatment regimen.  Script sent to pharmacy, patient aware.  Patient verbalized understanding and all questions were answered.

## 2024-04-29 NOTE — TELEPHONE ENCOUNTER
----- Message from Ailyn Cooley MD sent at 4/26/2024  1:03 AM EDT -----  Intermediate results, patient may opt for treatment - please contact patient to then pend and send preference.   Patient

## 2024-09-06 DIAGNOSIS — L30.9 DERMATITIS: ICD-10-CM

## 2024-09-06 RX ORDER — BENZOYL PEROXIDE 100 MG/ML
LIQUID TOPICAL
Qty: 237 ML | Refills: 2 | Status: SHIPPED | OUTPATIENT
Start: 2024-09-06

## 2024-10-21 ENCOUNTER — APPOINTMENT (OUTPATIENT)
Dept: PRIMARY CARE | Facility: CLINIC | Age: 39
End: 2024-10-21
Payer: COMMERCIAL

## 2024-12-28 ENCOUNTER — HOSPITAL ENCOUNTER (EMERGENCY)
Facility: HOSPITAL | Age: 39
Discharge: HOME | End: 2024-12-28
Payer: COMMERCIAL

## 2024-12-28 ENCOUNTER — APPOINTMENT (OUTPATIENT)
Dept: RADIOLOGY | Facility: HOSPITAL | Age: 39
End: 2024-12-28
Payer: COMMERCIAL

## 2024-12-28 VITALS
TEMPERATURE: 97.5 F | HEIGHT: 64 IN | WEIGHT: 154 LBS | DIASTOLIC BLOOD PRESSURE: 92 MMHG | OXYGEN SATURATION: 98 % | HEART RATE: 75 BPM | SYSTOLIC BLOOD PRESSURE: 129 MMHG | BODY MASS INDEX: 26.29 KG/M2 | RESPIRATION RATE: 18 BRPM

## 2024-12-28 DIAGNOSIS — M51.9 LUMBAR DISC DISORDER: ICD-10-CM

## 2024-12-28 DIAGNOSIS — M54.50 ACUTE LOW BACK PAIN WITHOUT SCIATICA, UNSPECIFIED BACK PAIN LATERALITY: Primary | ICD-10-CM

## 2024-12-28 LAB
APPEARANCE UR: ABNORMAL
BILIRUB UR STRIP.AUTO-MCNC: NEGATIVE MG/DL
COLOR UR: ABNORMAL
GLUCOSE UR STRIP.AUTO-MCNC: NORMAL MG/DL
HCG UR QL IA.RAPID: NEGATIVE
KETONES UR STRIP.AUTO-MCNC: ABNORMAL MG/DL
LEUKOCYTE ESTERASE UR QL STRIP.AUTO: ABNORMAL
MUCOUS THREADS #/AREA URNS AUTO: ABNORMAL /LPF
NITRITE UR QL STRIP.AUTO: NEGATIVE
PH UR STRIP.AUTO: 6 [PH]
PROT UR STRIP.AUTO-MCNC: ABNORMAL MG/DL
RBC # UR STRIP.AUTO: ABNORMAL /UL
RBC #/AREA URNS AUTO: >20 /HPF
SP GR UR STRIP.AUTO: 1.05
SQUAMOUS #/AREA URNS AUTO: ABNORMAL /HPF
UROBILINOGEN UR STRIP.AUTO-MCNC: ABNORMAL MG/DL
WBC #/AREA URNS AUTO: ABNORMAL /HPF

## 2024-12-28 PROCEDURE — 72100 X-RAY EXAM L-S SPINE 2/3 VWS: CPT

## 2024-12-28 PROCEDURE — 2500000004 HC RX 250 GENERAL PHARMACY W/ HCPCS (ALT 636 FOR OP/ED): Performed by: PHYSICIAN ASSISTANT

## 2024-12-28 PROCEDURE — 72100 X-RAY EXAM L-S SPINE 2/3 VWS: CPT | Performed by: RADIOLOGY

## 2024-12-28 PROCEDURE — 87086 URINE CULTURE/COLONY COUNT: CPT | Mod: WESLAB | Performed by: PHYSICIAN ASSISTANT

## 2024-12-28 PROCEDURE — 81025 URINE PREGNANCY TEST: CPT | Performed by: PHYSICIAN ASSISTANT

## 2024-12-28 PROCEDURE — 2500000001 HC RX 250 WO HCPCS SELF ADMINISTERED DRUGS (ALT 637 FOR MEDICARE OP): Performed by: PHYSICIAN ASSISTANT

## 2024-12-28 PROCEDURE — 96372 THER/PROPH/DIAG INJ SC/IM: CPT | Performed by: PHYSICIAN ASSISTANT

## 2024-12-28 PROCEDURE — 81001 URINALYSIS AUTO W/SCOPE: CPT | Performed by: PHYSICIAN ASSISTANT

## 2024-12-28 PROCEDURE — 99284 EMERGENCY DEPT VISIT MOD MDM: CPT

## 2024-12-28 PROCEDURE — 2500000005 HC RX 250 GENERAL PHARMACY W/O HCPCS: Performed by: PHYSICIAN ASSISTANT

## 2024-12-28 RX ORDER — LIDOCAINE 560 MG/1
1 PATCH PERCUTANEOUS; TOPICAL; TRANSDERMAL DAILY
Status: DISCONTINUED | OUTPATIENT
Start: 2024-12-28 | End: 2024-12-28 | Stop reason: HOSPADM

## 2024-12-28 RX ORDER — METHOCARBAMOL 500 MG/1
500 TABLET, FILM COATED ORAL 2 TIMES DAILY
Qty: 20 TABLET | Refills: 0 | Status: SHIPPED | OUTPATIENT
Start: 2024-12-28 | End: 2025-01-07

## 2024-12-28 RX ORDER — HYDROCODONE BITARTRATE AND ACETAMINOPHEN 5; 325 MG/1; MG/1
1 TABLET ORAL EVERY 6 HOURS PRN
Qty: 8 TABLET | Refills: 0 | Status: SHIPPED | OUTPATIENT
Start: 2024-12-28

## 2024-12-28 RX ORDER — METHOCARBAMOL 500 MG/1
1000 TABLET, FILM COATED ORAL ONCE
Status: COMPLETED | OUTPATIENT
Start: 2024-12-28 | End: 2024-12-28

## 2024-12-28 RX ORDER — KETOROLAC TROMETHAMINE 30 MG/ML
30 INJECTION, SOLUTION INTRAMUSCULAR; INTRAVENOUS ONCE
Status: COMPLETED | OUTPATIENT
Start: 2024-12-28 | End: 2024-12-28

## 2024-12-28 RX ORDER — PREDNISONE 20 MG/1
60 TABLET ORAL ONCE
Status: COMPLETED | OUTPATIENT
Start: 2024-12-28 | End: 2024-12-28

## 2024-12-28 RX ORDER — ACETAMINOPHEN 325 MG/1
650 TABLET ORAL EVERY 6 HOURS PRN
Qty: 30 TABLET | Refills: 0 | Status: SHIPPED | OUTPATIENT
Start: 2024-12-28 | End: 2025-01-07

## 2024-12-28 RX ORDER — ACETAMINOPHEN 325 MG/1
975 TABLET ORAL ONCE
Status: DISCONTINUED | OUTPATIENT
Start: 2024-12-28 | End: 2024-12-28

## 2024-12-28 RX ORDER — LIDOCAINE 50 MG/G
1 PATCH TOPICAL DAILY
Qty: 10 PATCH | Refills: 0 | Status: SHIPPED | OUTPATIENT
Start: 2024-12-28 | End: 2025-01-07

## 2024-12-28 RX ORDER — METHYLPREDNISOLONE 4 MG/1
TABLET ORAL
Qty: 21 TABLET | Refills: 0 | Status: SHIPPED | OUTPATIENT
Start: 2024-12-28 | End: 2025-01-04

## 2024-12-28 RX ORDER — HYDROCODONE BITARTRATE AND ACETAMINOPHEN 5; 325 MG/1; MG/1
1 TABLET ORAL ONCE
Status: COMPLETED | OUTPATIENT
Start: 2024-12-28 | End: 2024-12-28

## 2024-12-28 RX ADMIN — KETOROLAC TROMETHAMINE 30 MG: 30 INJECTION, SOLUTION INTRAMUSCULAR at 20:11

## 2024-12-28 RX ADMIN — PREDNISONE 60 MG: 20 TABLET ORAL at 20:09

## 2024-12-28 RX ADMIN — LIDOCAINE 4% 1 PATCH: 40 PATCH TOPICAL at 20:08

## 2024-12-28 RX ADMIN — HYDROCODONE BITARTRATE AND ACETAMINOPHEN 1 TABLET: 5; 325 TABLET ORAL at 20:09

## 2024-12-28 RX ADMIN — METHOCARBAMOL TABLETS 1000 MG: 500 TABLET, COATED ORAL at 20:09

## 2024-12-28 ASSESSMENT — COLUMBIA-SUICIDE SEVERITY RATING SCALE - C-SSRS
2. HAVE YOU ACTUALLY HAD ANY THOUGHTS OF KILLING YOURSELF?: NO
1. IN THE PAST MONTH, HAVE YOU WISHED YOU WERE DEAD OR WISHED YOU COULD GO TO SLEEP AND NOT WAKE UP?: NO
6. HAVE YOU EVER DONE ANYTHING, STARTED TO DO ANYTHING, OR PREPARED TO DO ANYTHING TO END YOUR LIFE?: NO

## 2024-12-28 ASSESSMENT — LIFESTYLE VARIABLES
HAVE PEOPLE ANNOYED YOU BY CRITICIZING YOUR DRINKING: NO
HAVE YOU EVER FELT YOU SHOULD CUT DOWN ON YOUR DRINKING: NO
TOTAL SCORE: 0
EVER FELT BAD OR GUILTY ABOUT YOUR DRINKING: NO
EVER HAD A DRINK FIRST THING IN THE MORNING TO STEADY YOUR NERVES TO GET RID OF A HANGOVER: NO

## 2024-12-28 ASSESSMENT — PAIN DESCRIPTION - LOCATION: LOCATION: BACK

## 2024-12-28 ASSESSMENT — PAIN SCALES - GENERAL
PAINLEVEL_OUTOF10: 7
PAINLEVEL_OUTOF10: 7

## 2024-12-28 ASSESSMENT — PAIN DESCRIPTION - PAIN TYPE: TYPE: CHRONIC PAIN

## 2024-12-28 ASSESSMENT — PAIN - FUNCTIONAL ASSESSMENT
PAIN_FUNCTIONAL_ASSESSMENT: 0-10
PAIN_FUNCTIONAL_ASSESSMENT: 0-10

## 2024-12-28 NOTE — Clinical Note
Jazz Ji was seen and treated in our emergency department on 12/28/2024.  She may return to work on 12/31/2024.       If you have any questions or concerns, please don't hesitate to call.      Elio Duvall PA-C

## 2024-12-29 LAB — HOLD SPECIMEN: NORMAL

## 2024-12-29 NOTE — ED PROVIDER NOTES
HPI   Chief Complaint   Patient presents with    Back Pain       39-year-old female presented emergency room with a chief complaint of acute on chronic low back pain.  Pain has been for several days.  She denies any specific injury or trauma thing she made her to the gym.  Denies bowel or bladder dysfunction, saddle anesthesia.  Well-appearing nontoxic afebrile.  No other complaint.              Patient History   Past Medical History:   Diagnosis Date    Acute sinusitis 2023    Bacterial vaginosis 2023    Encounter for full-term uncomplicated delivery      (spontaneous vaginal delivery)    Encounter for immunization     COVID-19 vaccine administered    Other shoulder lesions, unspecified shoulder 10/27/2015    Rotator cuff tendinitis    Vaginal odor 2023    Varicella without complication     Varicella without complication    Yeast infection of the vagina 2023     Past Surgical History:   Procedure Laterality Date    OTHER SURGICAL HISTORY  2021    Exploratory laparoscopy    OTHER SURGICAL HISTORY  2021    Dilation and curettage     Family History   Problem Relation Name Age of Onset    Hypertension Mother      Diabetes type II Father      Diabetes type II Sister      COPD Maternal Grandmother       Social History     Tobacco Use    Smoking status: Never    Smokeless tobacco: Never   Substance Use Topics    Alcohol use: Not on file     Comment: occasional    Drug use: Never       Physical Exam   ED Triage Vitals [24 1915]   Temperature Heart Rate Respirations BP   36.4 °C (97.5 °F) 75 18 (!) 129/92      Pulse Ox Temp Source Heart Rate Source Patient Position   98 % Temporal Monitor --      BP Location FiO2 (%)     -- --       Physical Exam  Vitals and nursing note reviewed.   Constitutional:       Appearance: Normal appearance.   HENT:      Head: Normocephalic.      Nose: Nose normal.      Mouth/Throat:      Mouth: Mucous membranes are moist.   Cardiovascular:      Rate  and Rhythm: Normal rate.      Pulses: Normal pulses.   Pulmonary:      Effort: Pulmonary effort is normal.   Abdominal:      General: Abdomen is flat.   Musculoskeletal:         General: Normal range of motion.      Cervical back: Normal range of motion.      Comments: Tenderness across entire lumbar area, no focal point tenderness   Neurological:      General: No focal deficit present.      Mental Status: She is alert and oriented to person, place, and time.   Psychiatric:         Mood and Affect: Mood normal.           ED Course & MDM   Diagnoses as of 12/28/24 2104   Acute low back pain without sciatica, unspecified back pain laterality   Lumbar disc disorder                 No data recorded     [place] Coma Scale Score: 15 (12/28/24 1919 : Codi Walker, RUTH)                           Medical Decision Making  I have seen and evaluated this patient.  Physician available for consultation.  Vital signs have been reviewed.  All laboratory and diagnostic imaging is reviewed by myself and interpreted by myself unless otherwise stated.  Additionally imaging is interpreted by radiologist.    X-ray with degenerative changes.  No evidence of cauda equina compression injury.  Treated symptomatically and placed on steroid medication with outpatient spine referral.  Released in stable condition from emergent standpoint.      Labs Reviewed   URINALYSIS WITH REFLEX CULTURE AND MICROSCOPIC - Abnormal       Result Value    Color, Urine Light-Orange (*)     Appearance, Urine Turbid (*)     Specific Gravity, Urine 1.047 (*)     pH, Urine 6.0      Protein, Urine 70 (1+) (*)     Glucose, Urine Normal      Blood, Urine OVER (3+) (*)     Ketones, Urine TRACE (*)     Bilirubin, Urine NEGATIVE      Urobilinogen, Urine 2 (1+) (*)     Nitrite, Urine NEGATIVE      Leukocyte Esterase, Urine 75 Drake/µL (*)     Narrative:     OVER is reported when the result is greater than the clinically reportable range.   MICROSCOPIC ONLY, URINE - Abnormal     WBC, Urine 1-5      RBC, Urine >20 (*)     Squamous Epithelial Cells, Urine 1-9 (SPARSE)      Mucus, Urine 2+     HCG, URINE, QUALITATIVE - Normal    HCG, Urine NEGATIVE     URINE CULTURE   URINALYSIS WITH REFLEX CULTURE AND MICROSCOPIC    Narrative:     The following orders were created for panel order Urinalysis with Reflex Culture and Microscopic.  Procedure                               Abnormality         Status                     ---------                               -----------         ------                     Urinalysis with Reflex C...[[id number]]  Abnormal            Final result               Extra Urine Gray Tube[[id number]]                            In process                   Please view results for these tests on the individual orders.   EXTRA URINE GRAY TUBE     XR lumbar spine 2-3 views   Final Result   1. No acute osseous abnormality. Minimal L4-L5 facet arthropathy.        Signed by: Rick Mccoy 12/28/2024 8:26 PM   Dictation workstation:   [id number]        Medications   lidocaine 4 % patch 1 patch (1 patch transdermal Medication Applied 12/28/24 2008)   ketorolac (Toradol) injection 30 mg (30 mg intramuscular Given 12/28/24 2011)   methocarbamol (Robaxin) tablet 1,000 mg (1,000 mg oral Given 12/28/24 2009)   HYDROcodone-acetaminophen (Norco) 5-325 mg per tablet 1 tablet (1 tablet oral Given 12/28/24 2009)   predniSONE (Deltasone) tablet 60 mg (60 mg oral Given 12/28/24 2009)     New Prescriptions    ACETAMINOPHEN (TYLENOL) 325 MG TABLET    Take 2 tablets (650 mg) by mouth every 6 hours if needed for mild pain (1 - 3) for up to 10 days.    HYDROCODONE-ACETAMINOPHEN (NORCO) 5-325 MG TABLET    Take 1 tablet by mouth every 6 hours if needed for severe pain (7 - 10) for up to 8 doses.    LIDOCAINE (LIDODERM) 5 % PATCH    Place 1 patch over 12 hours on the skin once daily for 10 doses. Remove & discard patch within 12 hours or as directed by MD.    METHOCARBAMOL (ROBAXIN) 500 MG TABLET     Take 1 tablet (500 mg) by mouth 2 times a day for 10 days.    METHYLPREDNISOLONE (MEDROL DOSPAK) 4 MG TABLETS    Follow schedule on package instructions         Procedure  Procedures     Elio Duvall PA-C  12/28/24 2104

## 2024-12-29 NOTE — ED TRIAGE NOTES
Pt presents ambulatory through triage with c/o lower back pain x3 weeks. Pt states it has gotten progressively worse over the last few days. Pt took ibuprofen and zanaflex around 1502 with no relief. Pt reports no urinary symptoms.

## 2024-12-30 LAB — BACTERIA UR CULT: NORMAL

## 2025-01-08 ENCOUNTER — OFFICE VISIT (OUTPATIENT)
Dept: ORTHOPEDIC SURGERY | Facility: CLINIC | Age: 40
End: 2025-01-08
Payer: COMMERCIAL

## 2025-01-08 DIAGNOSIS — G89.29 CHRONIC LOW BACK PAIN WITHOUT SCIATICA, UNSPECIFIED BACK PAIN LATERALITY: Primary | ICD-10-CM

## 2025-01-08 DIAGNOSIS — M62.830 SPASM OF MUSCLE OF LOWER BACK: ICD-10-CM

## 2025-01-08 DIAGNOSIS — M54.50 CHRONIC LOW BACK PAIN WITHOUT SCIATICA, UNSPECIFIED BACK PAIN LATERALITY: Primary | ICD-10-CM

## 2025-01-08 PROCEDURE — 99214 OFFICE O/P EST MOD 30 MIN: CPT | Performed by: ORTHOPAEDIC SURGERY

## 2025-01-08 PROCEDURE — 99204 OFFICE O/P NEW MOD 45 MIN: CPT | Performed by: ORTHOPAEDIC SURGERY

## 2025-01-08 PROCEDURE — 1036F TOBACCO NON-USER: CPT | Performed by: ORTHOPAEDIC SURGERY

## 2025-01-08 ASSESSMENT — ENCOUNTER SYMPTOMS
LOSS OF SENSATION IN FEET: 0
OCCASIONAL FEELINGS OF UNSTEADINESS: 0
DEPRESSION: 0

## 2025-01-08 ASSESSMENT — PAIN SCALES - GENERAL: PAINLEVEL_OUTOF10: 4

## 2025-01-08 ASSESSMENT — PAIN - FUNCTIONAL ASSESSMENT: PAIN_FUNCTIONAL_ASSESSMENT: 0-10
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
## 2025-01-13 ENCOUNTER — APPOINTMENT (OUTPATIENT)
Dept: PRIMARY CARE | Facility: CLINIC | Age: 40
End: 2025-01-13
Payer: COMMERCIAL

## 2025-01-18 ENCOUNTER — HOSPITAL ENCOUNTER (EMERGENCY)
Facility: HOSPITAL | Age: 40
Discharge: HOME | End: 2025-01-18
Payer: COMMERCIAL

## 2025-01-18 VITALS
TEMPERATURE: 97.7 F | WEIGHT: 155 LBS | HEIGHT: 64 IN | OXYGEN SATURATION: 100 % | SYSTOLIC BLOOD PRESSURE: 138 MMHG | RESPIRATION RATE: 16 BRPM | DIASTOLIC BLOOD PRESSURE: 89 MMHG | HEART RATE: 73 BPM | BODY MASS INDEX: 26.46 KG/M2

## 2025-01-18 DIAGNOSIS — R21 RASH: Primary | ICD-10-CM

## 2025-01-18 PROCEDURE — 99283 EMERGENCY DEPT VISIT LOW MDM: CPT

## 2025-01-18 RX ORDER — TRIAMCINOLONE ACETONIDE 1 MG/G
1 CREAM TOPICAL 2 TIMES DAILY
Qty: 2 G | Refills: 0 | Status: SHIPPED | OUTPATIENT
Start: 2025-01-18 | End: 2025-01-28

## 2025-01-18 RX ORDER — DOXYCYCLINE 100 MG/1
100 TABLET ORAL EVERY 12 HOURS
Qty: 14 TABLET | Refills: 0 | Status: SHIPPED | OUTPATIENT
Start: 2025-01-18

## 2025-01-18 ASSESSMENT — COLUMBIA-SUICIDE SEVERITY RATING SCALE - C-SSRS
1. IN THE PAST MONTH, HAVE YOU WISHED YOU WERE DEAD OR WISHED YOU COULD GO TO SLEEP AND NOT WAKE UP?: NO
6. HAVE YOU EVER DONE ANYTHING, STARTED TO DO ANYTHING, OR PREPARED TO DO ANYTHING TO END YOUR LIFE?: NO
2. HAVE YOU ACTUALLY HAD ANY THOUGHTS OF KILLING YOURSELF?: NO

## 2025-01-18 ASSESSMENT — LIFESTYLE VARIABLES
EVER FELT BAD OR GUILTY ABOUT YOUR DRINKING: NO
HAVE YOU EVER FELT YOU SHOULD CUT DOWN ON YOUR DRINKING: NO
EVER HAD A DRINK FIRST THING IN THE MORNING TO STEADY YOUR NERVES TO GET RID OF A HANGOVER: NO
HAVE PEOPLE ANNOYED YOU BY CRITICIZING YOUR DRINKING: NO
TOTAL SCORE: 0

## 2025-01-18 ASSESSMENT — PAIN - FUNCTIONAL ASSESSMENT: PAIN_FUNCTIONAL_ASSESSMENT: 0-10

## 2025-01-18 ASSESSMENT — PAIN DESCRIPTION - PROGRESSION: CLINICAL_PROGRESSION: NOT CHANGED

## 2025-01-18 ASSESSMENT — PAIN SCALES - GENERAL: PAINLEVEL_OUTOF10: 0 - NO PAIN

## 2025-01-18 NOTE — ED PROVIDER NOTES
HPI   Chief Complaint   Patient presents with    Insect Bite       HPI  See my MDM      Patient History   Past Medical History:   Diagnosis Date    Acute sinusitis 2023    Bacterial vaginosis 2023    Encounter for full-term uncomplicated delivery      (spontaneous vaginal delivery)    Encounter for immunization     COVID-19 vaccine administered    Other shoulder lesions, unspecified shoulder 10/27/2015    Rotator cuff tendinitis    Vaginal odor 2023    Varicella without complication     Varicella without complication    Yeast infection of the vagina 2023     Past Surgical History:   Procedure Laterality Date    OTHER SURGICAL HISTORY  2021    Exploratory laparoscopy    OTHER SURGICAL HISTORY  2021    Dilation and curettage     Family History   Problem Relation Name Age of Onset    Hypertension Mother      Diabetes type II Father      Diabetes type II Sister      COPD Maternal Grandmother       Social History     Tobacco Use    Smoking status: Never    Smokeless tobacco: Never   Substance Use Topics    Alcohol use: Not on file     Comment: occasional    Drug use: Never       Physical Exam   ED Triage Vitals [25 1627]   Temperature Heart Rate Respirations BP   36.5 °C (97.7 °F) 73 16 138/89      Pulse Ox Temp Source Heart Rate Source Patient Position   100 % Temporal Monitor --      BP Location FiO2 (%)     -- --       Physical Exam  CONSTITUTIONAL: Vital signs reviewed as charted, well-developed and in no distress  LUNGS: Breath sounds equal and clear to auscultation. Good air exchange, no wheezes rales or retractions, pulse oximetry is charted.  HEART: Regular rate and rhythm without murmur thrill or rub, strong tones, auscultation is normal.  Neuro: The patient is awake, alert and oriented ×3. Moving all 4 extremities and answering questions appropriately.   MUSCULOSKELETAL: The calves are nontender to palpation. Full gross active range of motion.   PSYCH: Awake alert  oriented, normal mood and affect.  Skin:  Dry, normal color, warm to the touch, examination of back shows a lesion present on the left Glute is red and raised with multiple white dots present.  It is hard.  No fluctuance.      ED Course & MDM   Diagnoses as of 01/18/25 1730   Rash                 No data recorded     Aissatou Coma Scale Score: 15 (01/18/25 1630 : Susanna Dodge RN)                           Medical Decision Making  History obtained from: patient    Vital signs, nursing notes, current medications, past medical history, Surgical history, allergies, social history, family History were reviewed.         HPI:  Patient 39-year-old female presenting emergency room today for evaluation of a lesion on the left [place].  States she noticed it yesterday states has been very itchy.  Has slightly grown in size.  Unsure if maybe she got bit by something.  It is red and raised.  Multiple white dots present.  No fluctuance.  No drainage.  Denies fever chills or night sweats.  Denies nausea vomiting diarrhea.  Nontoxic and well-appearing      10 point ROS was reviewed and negative except Noted above in HPI.  DDX: as listed above          MDM Summary/considerations:  Labs Reviewed - No data to display  No orders to display     Medications - No data to display  New Prescriptions    TRIAMCINOLONE (KENALOG) 0.1 % CREAM    Apply 1 Application topically 2 times a day for 10 days.     I estimate there is LOW risk for CELLULITIS, COMPARTMENT SYNDROME, NECROTIZING FASCIITIS, TENDON OR NEUROVASCULAR INJURY, or FOREIGN BODY, thus I consider the discharge disposition reasonable. Also, there is no evidence for peritonitis, sepsis, or toxicity. We have discussed the diagnosis and risks, and we agree with discharging home to follow-up with their primary doctor. We also discussed returning to the Emergency Department immediately if new or worsening symptoms occur. We have discussed the symptoms which are most concerning (e.g.,  changing or worsening pain, fever, numbness, weakness, cool or painful digits) that necessitate immediate return.         Will treat with dual therapy will start on steroid cream, antibiotics orally.  Patient will follow with PCP 1 to 2 days for reevaluation.  Was discharged home stable condition.    All of the patient's questions were answered to the best of my ability.  Patient states understanding that they have been screened for an emergency today and we have not found any etiology of symptoms that requires emergent treatment or admission to the hospital at this point. They understand that they have not had definitive care day and require follow-up for treatment of their condition. They also state understanding that they may have an emergent condition that may potentially have not of detected at this visit and they must return to the emergency department if they develop any worsening of symptoms or new complaints.      I have evaluated this patient, my supervising physician was available for consultation.              Critical Care: Not warranted at this time        This chart was completed using voice recognition transcription software. Please excuse any errors of transcription including grammatical, punctuation, syntax and spelling errors.  Please contact me with any questions regarding this chart.    Procedure  Procedures     JOSHUA Lind-CNP  01/18/25 7031

## 2025-01-23 ENCOUNTER — APPOINTMENT (OUTPATIENT)
Dept: PRIMARY CARE | Facility: CLINIC | Age: 40
End: 2025-01-23
Payer: COMMERCIAL

## 2025-01-23 ENCOUNTER — HOSPITAL ENCOUNTER (OUTPATIENT)
Dept: RADIOLOGY | Facility: CLINIC | Age: 40
Discharge: HOME | End: 2025-01-23
Payer: COMMERCIAL

## 2025-01-23 VITALS
OXYGEN SATURATION: 99 % | BODY MASS INDEX: 26.46 KG/M2 | WEIGHT: 155 LBS | HEIGHT: 64 IN | DIASTOLIC BLOOD PRESSURE: 86 MMHG | HEART RATE: 68 BPM | SYSTOLIC BLOOD PRESSURE: 130 MMHG

## 2025-01-23 DIAGNOSIS — G89.29 CHRONIC LOW BACK PAIN WITHOUT SCIATICA, UNSPECIFIED BACK PAIN LATERALITY: ICD-10-CM

## 2025-01-23 DIAGNOSIS — G43.709 CHRONIC MIGRAINE WITHOUT AURA WITHOUT STATUS MIGRAINOSUS, NOT INTRACTABLE: Primary | ICD-10-CM

## 2025-01-23 DIAGNOSIS — M54.50 CHRONIC LOW BACK PAIN WITHOUT SCIATICA, UNSPECIFIED BACK PAIN LATERALITY: ICD-10-CM

## 2025-01-23 DIAGNOSIS — M62.830 SPASM OF MUSCLE OF LOWER BACK: ICD-10-CM

## 2025-01-23 DIAGNOSIS — R11.0 NAUSEA: ICD-10-CM

## 2025-01-23 DIAGNOSIS — E55.9 VITAMIN D DEFICIENCY: ICD-10-CM

## 2025-01-23 DIAGNOSIS — R53.83 OTHER FATIGUE: ICD-10-CM

## 2025-01-23 DIAGNOSIS — Z13.220 LIPID SCREENING: ICD-10-CM

## 2025-01-23 PROCEDURE — 3008F BODY MASS INDEX DOCD: CPT | Performed by: INTERNAL MEDICINE

## 2025-01-23 PROCEDURE — 72148 MRI LUMBAR SPINE W/O DYE: CPT | Performed by: RADIOLOGY

## 2025-01-23 PROCEDURE — 99214 OFFICE O/P EST MOD 30 MIN: CPT | Performed by: INTERNAL MEDICINE

## 2025-01-23 PROCEDURE — 72148 MRI LUMBAR SPINE W/O DYE: CPT

## 2025-01-23 RX ORDER — ONDANSETRON 4 MG/1
4 TABLET, FILM COATED ORAL EVERY 8 HOURS PRN
Qty: 20 TABLET | Refills: 0 | Status: SHIPPED | OUTPATIENT
Start: 2025-01-23 | End: 2025-01-30

## 2025-01-23 ASSESSMENT — ENCOUNTER SYMPTOMS
DEPRESSION: 0
LOSS OF SENSATION IN FEET: 0
OCCASIONAL FEELINGS OF UNSTEADINESS: 0

## 2025-01-23 NOTE — ASSESSMENT & PLAN NOTE
She will be scheduled for routine blood work regarding her symptoms of fatigability.  She is advised to return to clinic in 1 months and will make further recommendations.  Orders:    CBC and Auto Differential; Future    Comprehensive metabolic panel; Future    Vitamin B12; Future    Urinalysis with Reflex Microscopic; Future

## 2025-01-23 NOTE — PROGRESS NOTES
"She will be scheduled for lipid panel.  Subjective   Patient ID: Jazz Ji is a 39 y.o. female who presents for New Patient Visit.    HPI patient presents to clinic to establish care.  She had been a former patient of Sherin meeks PA-C ,physician assistant.  She has been complaining of chronic fatigability and some nausea over the past few months.  She is doing well otherwise and denies any neck stiffness, fever chills abdominal pain, pelvic pain, vomiting fever chills and GI bleeding.    Past medical history significant for migraine headache ,acne,seasonal allergies,drmatitis,chronic back pain,vaginosis,chronic fatigue, menorrhagia and vitamin D deficiency.  Past surgical history notable for exploratory laparotomy D&C.      Review of Systems   Constitutional:  Positive for fatigue.   HENT: Negative.     Eyes: Negative.    Respiratory: Negative.     Cardiovascular: Negative.    Gastrointestinal: Negative.    Endocrine: Negative.    Genitourinary: Negative.    Musculoskeletal: Negative.    Skin: Negative.    Allergic/Immunologic: Negative.    Neurological: Negative.    Hematological: Negative.    Psychiatric/Behavioral: Negative.         Objective   /86 (BP Location: Right arm, Patient Position: Sitting)   Pulse 68   Ht 1.626 m (5' 4\")   Wt 70.3 kg (155 lb)   SpO2 99%   BMI 26.61 kg/m²     Physical Exam  Constitutional:       Appearance: Normal appearance. She is normal weight.   HENT:      Right Ear: Tympanic membrane normal.      Left Ear: Tympanic membrane and ear canal normal.      Nose: Nose normal.   Neck:      Vascular: No carotid bruit.   Cardiovascular:      Rate and Rhythm: Normal rate.   Pulmonary:      Effort: No respiratory distress.      Breath sounds: No stridor. No wheezing.   Abdominal:      Palpations: Abdomen is soft.      Tenderness: There is no abdominal tenderness. There is no guarding or rebound.   Skin:     Coloration: Skin is not jaundiced.      Findings: No bruising. "   Neurological:      General: No focal deficit present.      Mental Status: She is alert and oriented to person, place, and time.   Psychiatric:         Mood and Affect: Mood normal.         Assessment/Plan   Assessment & Plan  Chronic migraine without aura without status migrainosus, not intractable  Patient will Zavzpret continue regarding migraine headache.  She will continue Zofran on as needed basis.  Orders:    ondansetron (Zofran) 4 mg tablet; Take 1 tablet (4 mg) by mouth every 8 hours if needed for nausea or vomiting for up to 7 days.    Nausea  She started on Zofran for nausea.  Orders:    ondansetron (Zofran) 4 mg tablet; Take 1 tablet (4 mg) by mouth every 8 hours if needed for nausea or vomiting for up to 7 days.    Vitamin D deficiency  We will check vitamin D level.  Orders:    Vitamin D 25-Hydroxy,Total (for eval of Vitamin D levels); Future    Other fatigue  She will be scheduled for routine blood work regarding her symptoms of fatigability.  She is advised to return to clinic in 1 months and will make further recommendations.  Orders:    CBC and Auto Differential; Future    Comprehensive metabolic panel; Future    Vitamin B12; Future    Urinalysis with Reflex Microscopic; Future    Lipid screening  To check lipid panel  Orders:    Lipid Panel; Future

## 2025-01-23 NOTE — ASSESSMENT & PLAN NOTE
Patient will Zavzpret continue regarding migraine headache.  She will continue Zofran on as needed basis.  Orders:    ondansetron (Zofran) 4 mg tablet; Take 1 tablet (4 mg) by mouth every 8 hours if needed for nausea or vomiting for up to 7 days.

## 2025-01-23 NOTE — ASSESSMENT & PLAN NOTE
We will check vitamin D level.  Orders:    Vitamin D 25-Hydroxy,Total (for eval of Vitamin D levels); Future

## 2025-01-24 ENCOUNTER — LAB (OUTPATIENT)
Dept: LAB | Facility: LAB | Age: 40
End: 2025-01-24
Payer: COMMERCIAL

## 2025-01-24 ENCOUNTER — TELEPHONE (OUTPATIENT)
Dept: ORTHOPEDICS | Facility: HOSPITAL | Age: 40
End: 2025-01-24

## 2025-01-24 NOTE — TELEPHONE ENCOUNTER
I reviewed the MRI of the lumbar spine images.  She has a very large central disc herniation at L5-S1.  Please call her to inform her of this and have her make appointment to discuss treatment options

## 2025-01-26 ASSESSMENT — ENCOUNTER SYMPTOMS
ALLERGIC/IMMUNOLOGIC NEGATIVE: 1
HEMATOLOGIC/LYMPHATIC NEGATIVE: 1
MUSCULOSKELETAL NEGATIVE: 1
GASTROINTESTINAL NEGATIVE: 1
FATIGUE: 1
NEUROLOGICAL NEGATIVE: 1
PSYCHIATRIC NEGATIVE: 1
CARDIOVASCULAR NEGATIVE: 1
RESPIRATORY NEGATIVE: 1
EYES NEGATIVE: 1
ENDOCRINE NEGATIVE: 1

## 2025-01-31 NOTE — TELEPHONE ENCOUNTER
Problem: Chronic Conditions and Co-morbidities  Goal: Patient's chronic conditions and co-morbidity symptoms are monitored and maintained or improved  1/31/2025 0136 by Liliane Noriega LPN  Outcome: Progressing     Problem: Discharge Planning  Goal: Discharge to home or other facility with appropriate resources  1/31/2025 0136 by Liliane Noriega LPN  Outcome: Progressing     Problem: Pain  Goal: Verbalizes/displays adequate comfort level or baseline comfort level  1/31/2025 0136 by Liliane Noriega LPN  Outcome: Progressing     Problem: Safety - Adult  Goal: Free from fall injury  1/31/2025 0136 by Liliane Noriega LPN  Outcome: Progressing     Problem: Skin/Tissue Integrity  Goal: Skin integrity remains intact  Description: 1.  Monitor for areas of redness and/or skin breakdown  2.  Assess vascular access sites hourly  3.  Every 4-6 hours minimum:  Change oxygen saturation probe site  4.  Every 4-6 hours:  If on nasal continuous positive airway pressure, respiratory therapy assess nares and determine need for appliance change or resting period  1/31/2025 0136 by Liliane Noriega LPN  Outcome: Progressing      ----- Message from QUYNH Weir, APRN-CNP sent at 2/9/2024  5:18 PM EST -----  Regarding: FW: Cervix  Contact: [phone]  Thanks. Adry will likely do a pap. If she doesn't, I'll do it on 2/26/24.   ----- Message -----  From: Debbie Blanchard RN  Sent: 2/9/2024   8:20 AM EST  To: QUYNH Weir, APRN-CNP  Subject: FW: Cervix                                       I schedule the pt for an appt for the discomfort with Adry on 02/19/24, do you want her to have the pap sooner on 02/26/24 instead of 04/22/24?  ----- Message -----  From: Jazz Ji  Sent: 2/8/2024   7:57 PM EST  To: Do [facility] Clinical Support Staff  Subject: Cervix                                           Should o schedule an appointment with you and also I still feel the discomfort

## 2025-02-01 LAB
25(OH)D3+25(OH)D2 SERPL-MCNC: 34 NG/ML (ref 30–100)
ALBUMIN SERPL-MCNC: 4.4 G/DL (ref 3.6–5.1)
ALP SERPL-CCNC: 42 U/L (ref 31–125)
ALT SERPL-CCNC: 13 U/L (ref 6–29)
ANION GAP SERPL CALCULATED.4IONS-SCNC: 8 MMOL/L (CALC) (ref 7–17)
APPEARANCE UR: CLEAR
AST SERPL-CCNC: 15 U/L (ref 10–30)
BASOPHILS # BLD AUTO: 40 CELLS/UL (ref 0–200)
BASOPHILS NFR BLD AUTO: 1.1 %
BILIRUB SERPL-MCNC: 0.5 MG/DL (ref 0.2–1.2)
BILIRUB UR QL STRIP: NEGATIVE
BUN SERPL-MCNC: 15 MG/DL (ref 7–25)
CALCIUM SERPL-MCNC: 9.4 MG/DL (ref 8.6–10.2)
CHLORIDE SERPL-SCNC: 108 MMOL/L (ref 98–110)
CHOLEST SERPL-MCNC: 213 MG/DL
CHOLEST/HDLC SERPL: 2.3 (CALC)
CO2 SERPL-SCNC: 25 MMOL/L (ref 20–32)
COLOR UR: YELLOW
CREAT SERPL-MCNC: 0.74 MG/DL (ref 0.5–0.99)
EGFRCR SERPLBLD CKD-EPI 2021: 104 ML/MIN/1.73M2
EOSINOPHIL # BLD AUTO: 61 CELLS/UL (ref 15–500)
EOSINOPHIL NFR BLD AUTO: 1.7 %
ERYTHROCYTE [DISTWIDTH] IN BLOOD BY AUTOMATED COUNT: 11.6 % (ref 11–15)
GLUCOSE SERPL-MCNC: 90 MG/DL (ref 65–99)
GLUCOSE UR QL STRIP: NEGATIVE
HCT VFR BLD AUTO: 43.3 % (ref 35–45)
HDLC SERPL-MCNC: 91 MG/DL
HGB BLD-MCNC: 14.2 G/DL (ref 11.7–15.5)
HGB UR QL STRIP: NEGATIVE
KETONES UR QL STRIP: NEGATIVE
LDLC SERPL CALC-MCNC: 104 MG/DL (CALC)
LEUKOCYTE ESTERASE UR QL STRIP: NEGATIVE
LYMPHOCYTES # BLD AUTO: 1512 CELLS/UL (ref 850–3900)
LYMPHOCYTES NFR BLD AUTO: 42 %
MCH RBC QN AUTO: 32.2 PG (ref 27–33)
MCHC RBC AUTO-ENTMCNC: 32.8 G/DL (ref 32–36)
MCV RBC AUTO: 98.2 FL (ref 80–100)
MONOCYTES # BLD AUTO: 443 CELLS/UL (ref 200–950)
MONOCYTES NFR BLD AUTO: 12.3 %
NEUTROPHILS # BLD AUTO: 1544 CELLS/UL (ref 1500–7800)
NEUTROPHILS NFR BLD AUTO: 42.9 %
NITRITE UR QL STRIP: NEGATIVE
NONHDLC SERPL-MCNC: 122 MG/DL (CALC)
PH UR STRIP: NORMAL [PH] (ref 5–8)
PLATELET # BLD AUTO: 387 THOUSAND/UL (ref 140–400)
PMV BLD REES-ECKER: 9.8 FL (ref 7.5–12.5)
POTASSIUM SERPL-SCNC: 4.1 MMOL/L (ref 3.5–5.3)
PROT SERPL-MCNC: 7 G/DL (ref 6.1–8.1)
PROT UR QL STRIP: NEGATIVE
RBC # BLD AUTO: 4.41 MILLION/UL (ref 3.8–5.1)
SODIUM SERPL-SCNC: 141 MMOL/L (ref 135–146)
SP GR UR STRIP: 1.02 (ref 1–1.03)
TRIGL SERPL-MCNC: 85 MG/DL
VIT B12 SERPL-MCNC: 1744 PG/ML (ref 200–1100)
WBC # BLD AUTO: 3.6 THOUSAND/UL (ref 3.8–10.8)

## 2025-02-07 ENCOUNTER — OFFICE VISIT (OUTPATIENT)
Dept: ORTHOPEDIC SURGERY | Facility: CLINIC | Age: 40
End: 2025-02-07
Payer: COMMERCIAL

## 2025-02-07 DIAGNOSIS — M51.27 LUMBOSACRAL DISC HERNIATION: Primary | ICD-10-CM

## 2025-02-07 PROCEDURE — 1036F TOBACCO NON-USER: CPT | Performed by: ORTHOPAEDIC SURGERY

## 2025-02-07 PROCEDURE — 99214 OFFICE O/P EST MOD 30 MIN: CPT | Performed by: ORTHOPAEDIC SURGERY

## 2025-02-07 RX ORDER — GABAPENTIN 300 MG/1
300 CAPSULE ORAL 3 TIMES DAILY
Qty: 90 CAPSULE | Refills: 2 | Status: SHIPPED | OUTPATIENT
Start: 2025-02-07

## 2025-02-07 RX ORDER — ONDANSETRON 4 MG/1
4 TABLET, FILM COATED ORAL
COMMUNITY

## 2025-02-07 ASSESSMENT — ENCOUNTER SYMPTOMS
OCCASIONAL FEELINGS OF UNSTEADINESS: 0
LOSS OF SENSATION IN FEET: 0
DEPRESSION: 0

## 2025-02-07 ASSESSMENT — PAIN SCALES - GENERAL: PAINLEVEL_OUTOF10: 7

## 2025-02-07 ASSESSMENT — PAIN - FUNCTIONAL ASSESSMENT: PAIN_FUNCTIONAL_ASSESSMENT: 0-10

## 2025-02-07 ASSESSMENT — PAIN DESCRIPTION - DESCRIPTORS: DESCRIPTORS: ACHING;DULL

## 2025-02-07 NOTE — PROGRESS NOTES
The patient reports she has had no significant improvement in her back pain.  She has pain all the time.  It waxes and wanes in severity.  It is very uncomfortable to sit and to drive.  It is at her belt line and over the sacrum and can be either right-sided or left-sided or both.  There is no true leg pain.  Her straight leg raising is negative.  Her posture and gait are normal.  She has normal strength throughout.    Her MRI scan is reviewed with her.  She has a large central disc protrusion at L5-S1.  The spinal canal is spacious.  The foramina are spacious.  The remainder of the lumbar spine is anatomically normal.    We talked at length about options for disc herniation.  This is not acute, and she has had flareups over the last several years.  I explained the distinction between further conservative care and a surgical discectomy.  We talked about the possibility of pain management and epidural blocks.  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  She asked appropriate questions about the probability of improved outcome, and the possibility of complications and the relative risks.    She is clearly indecisive.  She is willing to try gabapentin.  We will not consider using narcotics.    Follow-up here as needed.

## 2025-02-25 ENCOUNTER — APPOINTMENT (OUTPATIENT)
Dept: PRIMARY CARE | Facility: CLINIC | Age: 40
End: 2025-02-25
Payer: COMMERCIAL

## 2025-02-25 VITALS
DIASTOLIC BLOOD PRESSURE: 84 MMHG | WEIGHT: 160 LBS | HEIGHT: 64 IN | HEART RATE: 71 BPM | SYSTOLIC BLOOD PRESSURE: 120 MMHG | BODY MASS INDEX: 27.31 KG/M2 | OXYGEN SATURATION: 98 %

## 2025-02-25 DIAGNOSIS — R79.89 ELEVATED VITAMIN B12 LEVEL: ICD-10-CM

## 2025-02-25 DIAGNOSIS — E78.5 DYSLIPIDEMIA: ICD-10-CM

## 2025-02-25 DIAGNOSIS — G43.709 CHRONIC MIGRAINE WITHOUT AURA WITHOUT STATUS MIGRAINOSUS, NOT INTRACTABLE: ICD-10-CM

## 2025-02-25 DIAGNOSIS — L30.9 DERMATITIS: ICD-10-CM

## 2025-02-25 DIAGNOSIS — E55.9 VITAMIN D DEFICIENCY: Primary | ICD-10-CM

## 2025-02-25 DIAGNOSIS — T78.40XD ALLERGY, SUBSEQUENT ENCOUNTER: ICD-10-CM

## 2025-02-25 PROCEDURE — 99213 OFFICE O/P EST LOW 20 MIN: CPT | Performed by: INTERNAL MEDICINE

## 2025-02-25 PROCEDURE — 3008F BODY MASS INDEX DOCD: CPT | Performed by: INTERNAL MEDICINE

## 2025-02-25 RX ORDER — BENZOYL PEROXIDE 100 MG/ML
LIQUID TOPICAL
Qty: 237 ML | Refills: 2 | Status: SHIPPED | OUTPATIENT
Start: 2025-02-25

## 2025-02-25 ASSESSMENT — ENCOUNTER SYMPTOMS
DEPRESSION: 0
LOSS OF SENSATION IN FEET: 0
OCCASIONAL FEELINGS OF UNSTEADINESS: 0

## 2025-02-25 ASSESSMENT — LIFESTYLE VARIABLES
HOW OFTEN DO YOU HAVE SIX OR MORE DRINKS ON ONE OCCASION: NEVER
SKIP TO QUESTIONS 9-10: 1
HOW MANY STANDARD DRINKS CONTAINING ALCOHOL DO YOU HAVE ON A TYPICAL DAY: 1 OR 2
AUDIT-C TOTAL SCORE: 1
HOW OFTEN DO YOU HAVE A DRINK CONTAINING ALCOHOL: MONTHLY OR LESS

## 2025-02-25 ASSESSMENT — PATIENT HEALTH QUESTIONNAIRE - PHQ9
1. LITTLE INTEREST OR PLEASURE IN DOING THINGS: NOT AT ALL
SUM OF ALL RESPONSES TO PHQ9 QUESTIONS 1 AND 2: 0
2. FEELING DOWN, DEPRESSED OR HOPELESS: NOT AT ALL

## 2025-02-25 NOTE — ASSESSMENT & PLAN NOTE
She will continue Maxalt and will be referred to neurology clinic regarding her history of migraines.  Orders:    Referral to Neurology; Future    CBC and Auto Differential; Future

## 2025-02-25 NOTE — ASSESSMENT & PLAN NOTE
She will continue vitamin D and will check vitamin D level.  Orders:    Vitamin D 25-Hydroxy,Total (for eval of Vitamin D levels); Future

## 2025-02-25 NOTE — PROGRESS NOTES
"Subjective   Patient ID: Jazz Ji is a 39 y.o. female who presents for Follow-up (Results. ).    HPI patient presents today to review her laboratory studies.  She is still having fatigability.  She has been under a lot of stress due to her 's health condition.  She denies any nausea, vomiting, fever chills, disequilibrium, weakness of arms legs and palpitation.  Laboratory studies done shows WBC of 3.6, hemoglobin of 14.2, B12 level of 1744, cholesterol of 213, LDL of 104 and other studies are unremarkable.    Review of Systems   Constitutional: Negative.    HENT: Negative.     Eyes: Negative.    Respiratory: Negative.     Cardiovascular: Negative.    Gastrointestinal: Negative.    Endocrine: Negative.    Genitourinary: Negative.    Musculoskeletal: Negative.    Skin: Negative.    Allergic/Immunologic: Negative.    Neurological: Negative.    Hematological: Negative.    Psychiatric/Behavioral: Negative.         Objective   /84 (BP Location: Left arm, Patient Position: Sitting)   Pulse 71   Ht 1.626 m (5' 4\")   Wt 72.6 kg (160 lb)   SpO2 98%   BMI 27.46 kg/m²     Physical Exam  Constitutional:       Appearance: Normal appearance. She is normal weight.   HENT:      Right Ear: Tympanic membrane normal.      Left Ear: Tympanic membrane and ear canal normal.      Nose: Nose normal.   Neck:      Vascular: No carotid bruit.   Cardiovascular:      Rate and Rhythm: Normal rate.   Pulmonary:      Effort: No respiratory distress.      Breath sounds: No stridor. No wheezing.   Abdominal:      Palpations: Abdomen is soft.      Tenderness: There is no abdominal tenderness. There is no guarding or rebound.   Skin:     Coloration: Skin is not jaundiced.      Findings: No bruising.   Neurological:      General: No focal deficit present.      Mental Status: She is alert and oriented to person, place, and time.   Psychiatric:         Mood and Affect: Mood normal.         Assessment/Plan   Assessment & " Plan  Dermatitis  She will continue benzyl peroxide regarding her history of acne and facial dermatitis.  Orders:    benzoyl peroxide (Benzac AC) 10 % external wash; WASH AFFECTED AREA WITH CLEANSER ONCE DAILY.    CBC and Auto Differential; Future    Vitamin D deficiency  She will continue vitamin D and will check vitamin D level.  Orders:    Vitamin D 25-Hydroxy,Total (for eval of Vitamin D levels); Future    Allergy, subsequent encounter  She will continue Claritin over-the-counter regarding allergy symptoms.       Chronic migraine without aura without status migrainosus, not intractable  She will continue Maxalt and will be referred to neurology clinic regarding her history of migraines.  Orders:    Referral to Neurology; Future    CBC and Auto Differential; Future    Dyslipidemia  She is advised to follow low-cholesterol diet and will monitor lipid panel.       Elevated vitamin B12 level  He is advised to discontinue taking vitamin B12 in view of elevated liver.  She will be scheduled for repeat blood work.  Orders:    Lipid Panel; Future    Vitamin B12; Future

## 2025-03-03 ASSESSMENT — ENCOUNTER SYMPTOMS
CARDIOVASCULAR NEGATIVE: 1
NEUROLOGICAL NEGATIVE: 1
RESPIRATORY NEGATIVE: 1
MUSCULOSKELETAL NEGATIVE: 1
GASTROINTESTINAL NEGATIVE: 1
CONSTITUTIONAL NEGATIVE: 1
EYES NEGATIVE: 1
ALLERGIC/IMMUNOLOGIC NEGATIVE: 1
ENDOCRINE NEGATIVE: 1
PSYCHIATRIC NEGATIVE: 1
HEMATOLOGIC/LYMPHATIC NEGATIVE: 1

## 2025-04-11 DIAGNOSIS — G43.909 MIGRAINE WITHOUT STATUS MIGRAINOSUS, NOT INTRACTABLE, UNSPECIFIED MIGRAINE TYPE: ICD-10-CM

## 2025-04-11 RX ORDER — RIZATRIPTAN BENZOATE 10 MG/1
10 TABLET, ORALLY DISINTEGRATING ORAL ONCE AS NEEDED
Qty: 9 TABLET | Refills: 1 | Status: SHIPPED | OUTPATIENT
Start: 2025-04-11

## 2025-04-23 ENCOUNTER — APPOINTMENT (OUTPATIENT)
Dept: OBSTETRICS AND GYNECOLOGY | Facility: CLINIC | Age: 40
End: 2025-04-23
Payer: COMMERCIAL

## 2025-04-23 VITALS
HEIGHT: 64 IN | DIASTOLIC BLOOD PRESSURE: 74 MMHG | SYSTOLIC BLOOD PRESSURE: 118 MMHG | WEIGHT: 157.2 LBS | BODY MASS INDEX: 26.84 KG/M2

## 2025-04-23 DIAGNOSIS — N89.8 VAGINAL DISCHARGE: Primary | ICD-10-CM

## 2025-04-23 PROCEDURE — 99213 OFFICE O/P EST LOW 20 MIN: CPT

## 2025-04-23 PROCEDURE — 1036F TOBACCO NON-USER: CPT

## 2025-04-23 PROCEDURE — 3008F BODY MASS INDEX DOCD: CPT

## 2025-04-23 ASSESSMENT — ENCOUNTER SYMPTOMS
NEUROLOGICAL NEGATIVE: 0
ALLERGIC/IMMUNOLOGIC NEGATIVE: 0
ENDOCRINE NEGATIVE: 0
PSYCHIATRIC NEGATIVE: 0
EYES NEGATIVE: 0
CARDIOVASCULAR NEGATIVE: 0
HEMATOLOGIC/LYMPHATIC NEGATIVE: 0
CONSTITUTIONAL NEGATIVE: 0
GASTROINTESTINAL NEGATIVE: 0
MUSCULOSKELETAL NEGATIVE: 0
RESPIRATORY NEGATIVE: 0

## 2025-04-23 ASSESSMENT — PAIN SCALES - GENERAL: PAINLEVEL_OUTOF10: 0-NO PAIN

## 2025-04-23 NOTE — PROGRESS NOTES
"Subjective   Jazz Ji is a 39 y.o. female who presents with concerns of vaginal discharge. Patient reports that she was having pelvic pain at the time that she scheduled this appointment but is overall not concerned about it at this time. She would like to be swabbed for BV/yeast today- she has a history of both and has noted some vaginal discharge. No odor, no pain. Patient reports that a few weeks ago the discharge was yellow but has since resolved. Patient denies concerns for STDS today.     Objective   /74   Ht 1.626 m (5' 4\")   Wt 71.3 kg (157 lb 3.2 oz)   LMP 04/02/2025 (Approximate)      General:   Alert and oriented x 3   Vulva: EGBUS normal   Vagina: Pink, normal discharge   Cervix: No CMT   Uterus: Normal shape, size   Adnexa: NT bilaterally     Assessment/Plan   Diagnoses and all orders for this visit:  Vaginal discharge  -     Vaginitis Gram Stain For Bacterial Vaginosis + Yeast  All patient questions answered.   Encouraged to reach out to our office with any questions or concerns.   Encouraged patient to follow up with annual and prn  QUYNH Martines   "

## 2025-04-24 LAB — BV SCORE VAG QL: ABNORMAL

## 2025-04-25 ENCOUNTER — PATIENT MESSAGE (OUTPATIENT)
Dept: OBSTETRICS AND GYNECOLOGY | Facility: CLINIC | Age: 40
End: 2025-04-25
Payer: COMMERCIAL

## 2025-04-25 DIAGNOSIS — B96.89 BACTERIAL VAGINOSIS: ICD-10-CM

## 2025-04-25 DIAGNOSIS — N76.0 BACTERIAL VAGINOSIS: ICD-10-CM

## 2025-04-28 DIAGNOSIS — B37.9 YEAST DETECTED: Primary | ICD-10-CM

## 2025-04-28 RX ORDER — METRONIDAZOLE 500 MG/1
500 TABLET ORAL 2 TIMES DAILY
Qty: 14 TABLET | Refills: 0 | Status: SHIPPED | OUTPATIENT
Start: 2025-04-28 | End: 2025-05-05

## 2025-04-28 RX ORDER — FLUCONAZOLE 150 MG/1
150 TABLET ORAL ONCE
Qty: 2 TABLET | Refills: 0 | Status: SHIPPED | OUTPATIENT
Start: 2025-04-28 | End: 2025-04-28

## 2025-05-14 ENCOUNTER — TELEPHONE (OUTPATIENT)
Dept: ORTHOPEDIC SURGERY | Facility: CLINIC | Age: 40
End: 2025-05-14
Payer: COMMERCIAL

## 2025-05-14 NOTE — TELEPHONE ENCOUNTER
patient calling, would like to know if she needs a referral to pain management, she states you and her discussed it at her last visit

## 2025-05-21 ENCOUNTER — OFFICE VISIT (OUTPATIENT)
Dept: PAIN MEDICINE | Facility: CLINIC | Age: 40
End: 2025-05-21
Payer: COMMERCIAL

## 2025-05-21 VITALS
DIASTOLIC BLOOD PRESSURE: 78 MMHG | SYSTOLIC BLOOD PRESSURE: 128 MMHG | RESPIRATION RATE: 16 BRPM | WEIGHT: 157 LBS | HEIGHT: 64 IN | OXYGEN SATURATION: 98 % | HEART RATE: 71 BPM | BODY MASS INDEX: 26.8 KG/M2

## 2025-05-21 DIAGNOSIS — M51.360 DISCOGENIC LUMBAR PAIN: Primary | ICD-10-CM

## 2025-05-21 DIAGNOSIS — M54.16 LUMBAR RADICULOPATHY: ICD-10-CM

## 2025-05-21 PROBLEM — T78.40XA ALLERGIES: Status: RESOLVED | Noted: 2023-04-28 | Resolved: 2025-05-21

## 2025-05-21 PROBLEM — R51.9 HEADACHE: Status: ACTIVE | Noted: 2017-12-29

## 2025-05-21 PROBLEM — M75.80 ROTATOR CUFF TENDINITIS: Status: ACTIVE | Noted: 2025-05-21

## 2025-05-21 PROCEDURE — 99204 OFFICE O/P NEW MOD 45 MIN: CPT | Performed by: STUDENT IN AN ORGANIZED HEALTH CARE EDUCATION/TRAINING PROGRAM

## 2025-05-21 PROCEDURE — 3008F BODY MASS INDEX DOCD: CPT | Performed by: STUDENT IN AN ORGANIZED HEALTH CARE EDUCATION/TRAINING PROGRAM

## 2025-05-21 PROCEDURE — 99214 OFFICE O/P EST MOD 30 MIN: CPT | Performed by: STUDENT IN AN ORGANIZED HEALTH CARE EDUCATION/TRAINING PROGRAM

## 2025-05-21 PROCEDURE — 1036F TOBACCO NON-USER: CPT | Performed by: STUDENT IN AN ORGANIZED HEALTH CARE EDUCATION/TRAINING PROGRAM

## 2025-05-21 RX ORDER — MELOXICAM 15 MG/1
15 TABLET ORAL DAILY
Qty: 30 TABLET | Refills: 1 | Status: SHIPPED | OUTPATIENT
Start: 2025-05-21 | End: 2025-07-20

## 2025-05-21 ASSESSMENT — PAIN - FUNCTIONAL ASSESSMENT: PAIN_FUNCTIONAL_ASSESSMENT: 0-10

## 2025-05-21 ASSESSMENT — PAIN SCALES - GENERAL
PAINLEVEL_OUTOF10: 4
PAINLEVEL_OUTOF10: 4

## 2025-05-21 ASSESSMENT — LIFESTYLE VARIABLES: TOTAL SCORE: 1

## 2025-05-21 ASSESSMENT — PAIN DESCRIPTION - DESCRIPTORS: DESCRIPTORS: SORE;ACHING
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
## 2025-06-02 ENCOUNTER — APPOINTMENT (OUTPATIENT)
Dept: OBSTETRICS AND GYNECOLOGY | Facility: CLINIC | Age: 40
End: 2025-06-02
Payer: COMMERCIAL

## 2025-06-02 ENCOUNTER — HOSPITAL ENCOUNTER (EMERGENCY)
Facility: HOSPITAL | Age: 40
Discharge: HOME | End: 2025-06-02
Payer: COMMERCIAL

## 2025-06-02 ENCOUNTER — APPOINTMENT (OUTPATIENT)
Dept: RADIOLOGY | Facility: HOSPITAL | Age: 40
End: 2025-06-02
Payer: COMMERCIAL

## 2025-06-02 VITALS
BODY MASS INDEX: 26.46 KG/M2 | OXYGEN SATURATION: 100 % | RESPIRATION RATE: 18 BRPM | HEIGHT: 64 IN | HEART RATE: 69 BPM | DIASTOLIC BLOOD PRESSURE: 91 MMHG | SYSTOLIC BLOOD PRESSURE: 129 MMHG | TEMPERATURE: 97.7 F | WEIGHT: 155 LBS

## 2025-06-02 DIAGNOSIS — G89.29 ACUTE ON CHRONIC LOW BACK PAIN: Primary | ICD-10-CM

## 2025-06-02 DIAGNOSIS — M54.50 ACUTE ON CHRONIC LOW BACK PAIN: Primary | ICD-10-CM

## 2025-06-02 PROCEDURE — 72100 X-RAY EXAM L-S SPINE 2/3 VWS: CPT | Performed by: RADIOLOGY

## 2025-06-02 PROCEDURE — 99284 EMERGENCY DEPT VISIT MOD MDM: CPT

## 2025-06-02 PROCEDURE — 2500000001 HC RX 250 WO HCPCS SELF ADMINISTERED DRUGS (ALT 637 FOR MEDICARE OP)

## 2025-06-02 PROCEDURE — 2500000004 HC RX 250 GENERAL PHARMACY W/ HCPCS (ALT 636 FOR OP/ED)

## 2025-06-02 PROCEDURE — 2500000005 HC RX 250 GENERAL PHARMACY W/O HCPCS

## 2025-06-02 PROCEDURE — 96372 THER/PROPH/DIAG INJ SC/IM: CPT

## 2025-06-02 PROCEDURE — 72100 X-RAY EXAM L-S SPINE 2/3 VWS: CPT

## 2025-06-02 RX ORDER — PREDNISONE 20 MG/1
60 TABLET ORAL ONCE
Status: COMPLETED | OUTPATIENT
Start: 2025-06-02 | End: 2025-06-02

## 2025-06-02 RX ORDER — PREDNISONE 50 MG/1
50 TABLET ORAL DAILY
Qty: 5 TABLET | Refills: 0 | Status: SHIPPED | OUTPATIENT
Start: 2025-06-02 | End: 2025-06-07

## 2025-06-02 RX ORDER — LIDOCAINE 560 MG/1
1 PATCH PERCUTANEOUS; TOPICAL; TRANSDERMAL ONCE
Status: DISCONTINUED | OUTPATIENT
Start: 2025-06-02 | End: 2025-06-02 | Stop reason: HOSPADM

## 2025-06-02 RX ORDER — METHOCARBAMOL 500 MG/1
500 TABLET, FILM COATED ORAL 2 TIMES DAILY
Qty: 20 TABLET | Refills: 0 | Status: SHIPPED | OUTPATIENT
Start: 2025-06-02 | End: 2025-06-12

## 2025-06-02 RX ORDER — LIDOCAINE 50 MG/G
1 PATCH TOPICAL DAILY
Qty: 6 PATCH | Refills: 0 | Status: SHIPPED | OUTPATIENT
Start: 2025-06-02

## 2025-06-02 RX ORDER — KETOROLAC TROMETHAMINE 15 MG/ML
15 INJECTION, SOLUTION INTRAMUSCULAR; INTRAVENOUS ONCE
Status: COMPLETED | OUTPATIENT
Start: 2025-06-02 | End: 2025-06-02

## 2025-06-02 RX ORDER — METHOCARBAMOL 500 MG/1
500 TABLET, FILM COATED ORAL ONCE
Status: COMPLETED | OUTPATIENT
Start: 2025-06-02 | End: 2025-06-02

## 2025-06-02 RX ADMIN — PREDNISONE 60 MG: 20 TABLET ORAL at 09:20

## 2025-06-02 RX ADMIN — LIDOCAINE 4% 1 PATCH: 40 PATCH TOPICAL at 09:20

## 2025-06-02 RX ADMIN — KETOROLAC TROMETHAMINE 15 MG: 15 INJECTION, SOLUTION INTRAMUSCULAR; INTRAVENOUS at 09:20

## 2025-06-02 RX ADMIN — METHOCARBAMOL 500 MG: 500 TABLET ORAL at 09:20

## 2025-06-02 ASSESSMENT — PAIN DESCRIPTION - PROGRESSION: CLINICAL_PROGRESSION: NOT CHANGED

## 2025-06-02 ASSESSMENT — COLUMBIA-SUICIDE SEVERITY RATING SCALE - C-SSRS
6. HAVE YOU EVER DONE ANYTHING, STARTED TO DO ANYTHING, OR PREPARED TO DO ANYTHING TO END YOUR LIFE?: NO
1. IN THE PAST MONTH, HAVE YOU WISHED YOU WERE DEAD OR WISHED YOU COULD GO TO SLEEP AND NOT WAKE UP?: NO
2. HAVE YOU ACTUALLY HAD ANY THOUGHTS OF KILLING YOURSELF?: NO

## 2025-06-02 ASSESSMENT — PAIN - FUNCTIONAL ASSESSMENT: PAIN_FUNCTIONAL_ASSESSMENT: 0-10

## 2025-06-02 ASSESSMENT — PAIN SCALES - GENERAL: PAINLEVEL_OUTOF10: 4

## 2025-06-02 ASSESSMENT — PAIN DESCRIPTION - LOCATION: LOCATION: BACK

## 2025-06-02 NOTE — DISCHARGE INSTRUCTIONS
Thank you for visiting [facility] emergency department.  It was a pleasure caring for you.    Be sure to take all medications, over the counter medications or prescription medications only as directed.     Be sure to follow up as directed in 1-2 days.  All of the details of your follow up instructions are detailed in the follow up section of this packet.    If you are being discharged with any pains medications or muscle relaxers (norco, Vicodin, hydrocodone products, Percocet, oxycodone products, flexeril, cyclobenzaprine, robaxin, norflex, brand or generic, or any other pain controlling medications with the exception of Ibuprofen and regular Tylenol, do not drive or operate machinery, climb ladders or participate in any activity that could potentially put yourself or others at risk should you get dizzy, or be/feel impaired at all.        It is important to remember that your care does not end here and you must continue to monitor your condition closely. Please return to the emergency department for any worsening or concerning signs or symptoms as directed by our conversations and the discharge instructions. Otherwise please follow up with your doctor in 2 days if no better or worse. If you do not have a doctor please contact the referral number on your discharge instructions. Please contact any physician specialists provided in your discharge notes as it is very important to follow up with them regarding your condition. If you are unable to reach the physicians provided, please come back to the Emergency Department at any time.     As always, please take medications as directed. If you have any questions at all regarding your medications, please contact the pharmacist, the emergency department, or your doctor. Before taking any medication prescribed in the Emergency Department, please review the medication side effects and drug interactions (<http://www.rxlist.com/script/main/hp.asp>) as they may interact with your  home medications.     Having trouble affording medications? Try [email or web address] <[email or web address]! (This is not a hospital endorsed website, merely a recommendation based on my own personal experiences with [facility])      Return to emergency room without delay for ANY new or worsening pains or for any other symptoms or concerns.      Return with worsening pains, nausea, vomiting, trouble breathing, palpitations, shortness of breath, inability to pass stool or urine, loss of control of stool or urine, any numbness or tingling (that is not normal for you), uncontrolled fevers, the passing of blood or other material in stool or urine, rashes, pains or for any other symptoms or concerns you may have.  You are always welcome to return to the ER at any time for any reason or for any other concerns you may have.

## 2025-06-02 NOTE — ED PROVIDER NOTES
HPI   Chief Complaint   Patient presents with    Back Pain     Back pain. States she see pain management for this feels she has a buldging disc.        Patient is a 39-year-old female presenting to the emergency department for evaluation of low back pain.  Patient states she has a history of chronic low back pain.  She sees orthospine and pain management for it.  She states she was on gabapentin and then placed on meloxicam by her pain management doctor.  She states she was not sure if she could take the meloxicam with the gabapentin and therefore she stopped taking the gabapentin.  She states she cut the lawn yesterday and has since had extreme pain in her right lower back.  She states she thinks she overdid it.  She denies any known trauma or injury.  She denies any numbness or tingling down the legs however does have shooting pain down the right lower extremity.  She denies any loss of bowel or bladder control.  She denies any saddle anesthesias.  She states pain is worse with movement.  Nothing makes it better.              Patient History   Medical History[1]  Surgical History[2]  Family History[3]  Social History[4]    Physical Exam   ED Triage Vitals [06/02/25 0747]   Temperature Heart Rate Respirations BP   36.5 °C (97.7 °F) 69 18 (!) 129/91      Pulse Ox Temp Source Heart Rate Source Patient Position   100 % Tympanic Monitor Sitting      BP Location FiO2 (%)     Left arm --       Physical Exam  Vitals and nursing note reviewed.   Constitutional:       General: She is not in acute distress.     Appearance: Normal appearance. She is not ill-appearing or toxic-appearing.   HENT:      Head: Normocephalic and atraumatic.      Nose: Nose normal.      Mouth/Throat:      Mouth: Mucous membranes are moist.   Cardiovascular:      Rate and Rhythm: Normal rate and regular rhythm.      Pulses: Normal pulses.      Comments: 2+ strong and equal DP/PT pulses bilaterally  Pulmonary:      Effort: Pulmonary effort is normal.    Musculoskeletal:         General: Tenderness (Right lumbar paraspinal with no midline tenderness, step-offs, deformities down the TLS spine) present.      Cervical back: Normal range of motion.      Comments: Limited range of motion of the lower back due to pain   Skin:     General: Skin is warm and dry.   Neurological:      General: No focal deficit present.      Mental Status: She is alert and oriented to person, place, and time.      Sensory: No sensory deficit.      Motor: No weakness.      Comments: Normal Sensation in the bilateral inner thighs, lower legs, and feet  5/5 strength bilateral abduction/adduction  5/5 strength bilateral flexion and extension of knee  5/5 strength bilateral dorsiflexion and plantar flexion of ankle   5/5 strength plantarflexion of great toes bilaterally  2+ bilateral knee reflexes  Positive straight leg raise on the right   Psychiatric:         Mood and Affect: Mood normal.         Behavior: Behavior normal.           ED Course & MDM   Diagnoses as of 06/02/25 0904   Acute on chronic low back pain                 No data recorded     [place] Coma Scale Score: 15 (06/02/25 0753 : Priscilla Cruz RN)                           Medical Decision Making  **Disclaimer parts of this chart have been completed using voice recognition software. Please excuse any errors of transcription.     Evaluated this patient independently and my supervising physician was available for consultation.    HPI: Detailed above.    Exam: A medically appropriate exam performed, outlined above, given the known history and presentation.    History obtained from: Patient    Labs/Diagnostics:  XR lumbar spine 2-3 views   Final Result   No fracture dislocation or significant arthritic changes.   Ring-like radiopaque density overlying the right upper quadrant of   indeterminate etiology. Correlate with overlying object.        Signed by: Kasey Nicole 6/2/2025 8:53 AM   Dictation workstation:   [id number]     "    EMERGENCY DEPARTMENT COURSE and DIFFERENTIAL DIAGNOSIS/MDM:  Patient is a 39-year-old female presenting to the emergency department for evaluation of low back pain.  On physical exam vital signs remarkable for diastolic hypertension but otherwise stable patient is in no acute distress.  Patient has tenderness to palpation in the right lumbar paraspinal with no midline tenderness, step-offs, deformities.  No new injury.  No saddle anesthesias or bowel or bladder incontinence.  X-ray of the lumbar spine ordered as well as IM Toradol, lidocaine patch, p.o. prednisone and Robaxin.  X-ray of lumbar spine showed no acute fracture or significant arthritic changes.  Patient feeling better after medications.  She was discharged in stable condition with a prescription for prednisone, lidocaine patches, and Robaxin.  She is advised to follow-up with primary care physician, pain management, and orthospine outpatient for further management.  She will return to the emergency department with any new or worsening symptoms.    Based on patient history and exam, low red flag score and normal neuro exam of all lumbar and sacral nerve distributions  I have considered but suspect there is a very low risk for cauda equina syndrome, epidural abscess or hematoma, mass lesion, or other infectious process such as discitis, osteomyelitis or transverse myelitis, severe spinal stenosis, or emergent back pathology. Given low risk while I have considered  advanced imaging such as MRI or CT of the spine, but given low risk these can be deferred at this time and patient can be managed symptomatically.    Vitals:    Vitals:    06/02/25 0747 06/02/25 0753   BP: (!) 129/91    BP Location: Left arm    Patient Position: Sitting    Pulse: 69    Resp: 18    Temp: 36.5 °C (97.7 °F)    TempSrc: Tympanic    SpO2: 100% 100%   Weight: 70.3 kg (155 lb)    Height: 1.626 m (5' 4\")      History Limited by:    None    Independent history obtained " from:    None    External records reviewed:    Outpatient Labs and/or studies previous MRI on 2025 as well as pain management note from 2025    Diagnostics interpreted by me:    Xrays - see my independent interpretation in MDM    Discussions with other clinicians:    None    Chronic conditions impacting care:    None    Social determinants of health affecting care:    None    Diagnostic tests considered but not performed: None    ED Medications managed:    Medications   ketorolac (Toradol) injection 15 mg (has no administration in time range)   lidocaine 4 % patch 1 patch (has no administration in time range)   predniSONE (Deltasone) tablet 60 mg (has no administration in time range)   methocarbamol (Robaxin) tablet 500 mg (has no administration in time range)       Prescription drugs considered:     Prednisone, lidocaine patches, Robaxin    Screenings:              Procedure  Procedures         [1]   Past Medical History:  Diagnosis Date    Acute sinusitis 2023    Bacterial vaginosis 2023    Encounter for full-term uncomplicated delivery      (spontaneous vaginal delivery)    Encounter for immunization     COVID-19 vaccine administered    Other shoulder lesions, unspecified shoulder 10/27/2015    Rotator cuff tendinitis    Vaginal odor 2023    Varicella without complication     Varicella without complication    Yeast infection of the vagina 2023   [2]   Past Surgical History:  Procedure Laterality Date    OTHER SURGICAL HISTORY  2021    Exploratory laparoscopy    OTHER SURGICAL HISTORY  2021    Dilation and curettage   [3]   Family History  Problem Relation Name Age of Onset    Hypertension Mother      Diabetes type II Father      Diabetes type II Sister      COPD Maternal Grandmother     [4]   Social History  Tobacco Use    Smoking status: Never    Smokeless tobacco: Never   Vaping Use    Vaping status: Never Used   Substance Use Topics    Alcohol use: Yes      Comment: once every two months    Drug use: Never        Teri Cisneros PA-C  06/02/25 0904

## 2025-06-02 NOTE — Clinical Note
Jazz Ji was seen and treated in our emergency department on 6/2/2025.  She may return to work on 06/04/2025.       If you have any questions or concerns, please don't hesitate to call.      Teri Cisneros PA-C

## 2025-06-05 ENCOUNTER — PATIENT MESSAGE (OUTPATIENT)
Dept: CARE COORDINATION | Facility: CLINIC | Age: 40
End: 2025-06-05
Payer: COMMERCIAL

## 2025-06-12 ENCOUNTER — TELEMEDICINE (OUTPATIENT)
Dept: PAIN MEDICINE | Facility: CLINIC | Age: 40
End: 2025-06-12
Payer: COMMERCIAL

## 2025-06-12 DIAGNOSIS — M51.362 DEGENERATION OF INTERVERTEBRAL DISC OF LUMBAR REGION WITH DISCOGENIC BACK PAIN AND LOWER EXTREMITY PAIN: ICD-10-CM

## 2025-06-12 DIAGNOSIS — M54.16 LUMBAR RADICULOPATHY: Primary | ICD-10-CM

## 2025-06-12 PROCEDURE — 99214 OFFICE O/P EST MOD 30 MIN: CPT | Performed by: STUDENT IN AN ORGANIZED HEALTH CARE EDUCATION/TRAINING PROGRAM

## 2025-06-12 PROCEDURE — 1036F TOBACCO NON-USER: CPT | Performed by: STUDENT IN AN ORGANIZED HEALTH CARE EDUCATION/TRAINING PROGRAM

## 2025-06-12 ASSESSMENT — PAIN SCALES - GENERAL
PAINLEVEL_OUTOF10: 5
PAINLEVEL_OUTOF10: 5 - MODERATE PAIN

## 2025-06-12 ASSESSMENT — PAIN DESCRIPTION - DESCRIPTORS: DESCRIPTORS: ACHING;THROBBING

## 2025-06-12 ASSESSMENT — PAIN - FUNCTIONAL ASSESSMENT: PAIN_FUNCTIONAL_ASSESSMENT: 0-10
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
## 2025-06-25 ENCOUNTER — APPOINTMENT (OUTPATIENT)
Dept: OBSTETRICS AND GYNECOLOGY | Facility: CLINIC | Age: 40
End: 2025-06-25
Payer: COMMERCIAL

## 2025-06-25 VITALS
SYSTOLIC BLOOD PRESSURE: 132 MMHG | BODY MASS INDEX: 27.45 KG/M2 | WEIGHT: 160.8 LBS | DIASTOLIC BLOOD PRESSURE: 92 MMHG | HEIGHT: 64 IN

## 2025-06-25 DIAGNOSIS — D21.9 FIBROIDS: ICD-10-CM

## 2025-06-25 DIAGNOSIS — N89.8 VAGINAL ODOR: ICD-10-CM

## 2025-06-25 DIAGNOSIS — Z01.419 WELL WOMAN EXAM WITH ROUTINE GYNECOLOGICAL EXAM: Primary | ICD-10-CM

## 2025-06-25 DIAGNOSIS — Z12.31 SCREENING MAMMOGRAM, ENCOUNTER FOR: ICD-10-CM

## 2025-06-25 PROCEDURE — 99396 PREV VISIT EST AGE 40-64: CPT

## 2025-06-25 PROCEDURE — 3008F BODY MASS INDEX DOCD: CPT

## 2025-06-25 ASSESSMENT — ENCOUNTER SYMPTOMS
ALLERGIC/IMMUNOLOGIC NEGATIVE: 0
CARDIOVASCULAR NEGATIVE: 0
HEMATOLOGIC/LYMPHATIC NEGATIVE: 0
GASTROINTESTINAL NEGATIVE: 0
MUSCULOSKELETAL NEGATIVE: 0
EYES NEGATIVE: 0
NEUROLOGICAL NEGATIVE: 0
ENDOCRINE NEGATIVE: 0
RESPIRATORY NEGATIVE: 0
PSYCHIATRIC NEGATIVE: 0
CONSTITUTIONAL NEGATIVE: 0

## 2025-06-25 ASSESSMENT — PAIN SCALES - GENERAL: PAINLEVEL_OUTOF10: 0-NO PAIN

## 2025-06-25 NOTE — PROGRESS NOTES
"Subjective   Jazz Ji is a 39 y.o. female who is here for Annual Exam (Last pap 24).     Reports concern for general vaginal odor, would like evaluation today.     Periods are regular every 28-30 days, lasting 5 days.   Dysmenorrhea:moderate, occurring throughout menses.   Cyclic symptoms include none.     Patient has been diagnosd with multiple uterine fibroids.     Sexual Activity: sexually active, male partners; Patient reports 1 partners in the last 12 months.  Pain with intercourse? No     History of prior STI: chlamydia-very distant history- treated   Desires STI screening? No    Current contraception: none    Last pap: 24- last 3 wnl  History of abnormal Pap smear: no    Family history of uterine or ovarian cancer: no  Family history of breast cancer: no      OB History    Para Term  AB Living   6 2 2 0 4 2   SAB IAB Ectopic Multiple Live Births   1 3 0 0 2      Objective   BP (!) 132/92   Ht 1.626 m (5' 4\")   Wt 72.9 kg (160 lb 12.8 oz)   LMP 2025 (Approximate)      General:   Alert and oriented x 3   Heart:  Lungs: Regular rate, rhythm  Clear to auscultation bilaterally   Thyroid: Euthyroid, normal shape and size   Breast: Symmetrical, no skin changes/nipple discharge, redness, tenderness, no masses palpated bilaterally   Abdomen: Soft, non tender   Vulva: EGBUS normal   Vagina: Pink, normal discharge   Cervix: No CMT   Uterus: Normal shape, size   Adnexa: NT bilaterally         Assessment/Plan   Diagnoses and all orders for this visit:  Well woman exam with routine gynecological exam  Fibroids  -     US PELVIS TRANSABDOMINAL WITH TRANSVAGINAL; Future  Screening mammogram, encounter for  -     BI mammo bilateral screening tomosynthesis; Future  Vaginal odor  -     Vaginitis Gram Stain For Bacterial Vaginosis + Yeast    All patient questions answered.   Encouraged to reach out to our office with any questions or concerns.   Encouraged patient to follow up annually and " prn    Adry Mckay, JOSHUA-CNM

## 2025-07-02 ENCOUNTER — APPOINTMENT (OUTPATIENT)
Dept: RADIOLOGY | Facility: HOSPITAL | Age: 40
End: 2025-07-02
Payer: COMMERCIAL

## 2025-07-02 ENCOUNTER — APPOINTMENT (OUTPATIENT)
Dept: RADIOLOGY | Facility: CLINIC | Age: 40
End: 2025-07-02
Payer: COMMERCIAL

## 2025-07-02 ENCOUNTER — TELEPHONE (OUTPATIENT)
Dept: OBSTETRICS AND GYNECOLOGY | Facility: CLINIC | Age: 40
End: 2025-07-02

## 2025-07-02 VITALS — HEIGHT: 64 IN | BODY MASS INDEX: 27.31 KG/M2 | WEIGHT: 160 LBS

## 2025-07-02 DIAGNOSIS — Z12.31 SCREENING MAMMOGRAM, ENCOUNTER FOR: ICD-10-CM

## 2025-07-02 LAB
BV SCORE VAG QL: NORMAL
QUEST TRACKING HOUSE ACCOUNT: NORMAL

## 2025-07-02 PROCEDURE — 77067 SCR MAMMO BI INCL CAD: CPT | Performed by: RADIOLOGY

## 2025-07-02 PROCEDURE — 77063 BREAST TOMOSYNTHESIS BI: CPT | Performed by: RADIOLOGY

## 2025-07-02 PROCEDURE — 77063 BREAST TOMOSYNTHESIS BI: CPT

## 2025-07-02 NOTE — TELEPHONE ENCOUNTER
Nurse called [facility].  Spoke ot Hussain PHAN   Pt verified by name and .  Nurse calling for pt's vaginitis panel results.  Per Hussain he will fax them to the office.

## 2025-07-03 ENCOUNTER — TELEPHONE (OUTPATIENT)
Dept: OBSTETRICS AND GYNECOLOGY | Facility: CLINIC | Age: 40
End: 2025-07-03
Payer: COMMERCIAL

## 2025-07-03 NOTE — TELEPHONE ENCOUNTER
Pt verified by name and .  Pt is aware of shaista bettencourt's message:  Her labs were intermittent, no infection but not 100% normal. She can try boric acid suppositories ot help with her symptoms. I can also offer her to come back in and retest.   Pt is aware nurse will discuss with shaista Mckay an appt date on Monday.  Pt has no questions at this time.

## 2025-07-07 ENCOUNTER — TELEPHONE (OUTPATIENT)
Dept: OBSTETRICS AND GYNECOLOGY | Facility: CLINIC | Age: 40
End: 2025-07-07
Payer: COMMERCIAL

## 2025-07-07 DIAGNOSIS — N76.0 BV (BACTERIAL VAGINOSIS): Primary | ICD-10-CM

## 2025-07-07 DIAGNOSIS — B96.89 BV (BACTERIAL VAGINOSIS): Primary | ICD-10-CM

## 2025-07-07 RX ORDER — METRONIDAZOLE 500 MG/1
500 TABLET ORAL 2 TIMES DAILY
Qty: 14 TABLET | Refills: 0 | Status: SHIPPED | OUTPATIENT
Start: 2025-07-07 | End: 2025-07-14

## 2025-07-07 NOTE — TELEPHONE ENCOUNTER
Called pt and verified by name and . Pt. Informed Rx. Was sent to pharmacy. Questions addressed pt. Will call back if needed.

## 2025-07-08 ENCOUNTER — TELEPHONE (OUTPATIENT)
Dept: OBSTETRICS AND GYNECOLOGY | Facility: CLINIC | Age: 40
End: 2025-07-08
Payer: COMMERCIAL

## 2025-07-09 PROBLEM — F90.0 ATTENTION DEFICIT HYPERACTIVITY DISORDER (ADHD), PREDOMINANTLY INATTENTIVE TYPE: Status: ACTIVE | Noted: 2025-07-09

## 2025-07-09 PROBLEM — L72.9 CYST OF SKIN: Status: ACTIVE | Noted: 2025-07-09

## 2025-07-09 PROBLEM — D25.9 UTERINE LEIOMYOMA: Status: ACTIVE | Noted: 2025-07-09

## 2025-07-09 PROBLEM — L98.9 INFLAMMATORY DERMATOSIS: Status: ACTIVE | Noted: 2025-07-09

## 2025-07-09 PROBLEM — M79.10 MUSCLE PAIN: Status: ACTIVE | Noted: 2025-07-09

## 2025-07-09 PROBLEM — B01.9 VARICELLA: Status: ACTIVE | Noted: 2025-07-09

## 2025-07-09 PROBLEM — M54.50 LOW BACK PAIN: Status: ACTIVE | Noted: 2025-07-09

## 2025-07-09 PROBLEM — B37.31 CANDIDIASIS OF VAGINA: Status: ACTIVE | Noted: 2025-07-09

## 2025-07-09 PROBLEM — N94.6 DYSMENORRHEA: Status: ACTIVE | Noted: 2025-07-09

## 2025-07-09 PROBLEM — N93.9 VAGINAL BLEEDING: Status: ACTIVE | Noted: 2025-07-09

## 2025-07-09 PROBLEM — F41.8 MIXED ANXIETY DEPRESSIVE DISORDER: Status: ACTIVE | Noted: 2025-07-09

## 2025-07-09 PROBLEM — M79.646 PAIN OF FINGER: Status: ACTIVE | Noted: 2025-07-09

## 2025-07-10 ENCOUNTER — HOSPITAL ENCOUNTER (OUTPATIENT)
Dept: RADIOLOGY | Facility: CLINIC | Age: 40
Discharge: HOME | End: 2025-07-10
Payer: COMMERCIAL

## 2025-07-10 DIAGNOSIS — D21.9 FIBROIDS: ICD-10-CM

## 2025-07-11 ENCOUNTER — ANCILLARY PROCEDURE (OUTPATIENT)
Dept: RADIOLOGY | Facility: EXTERNAL LOCATION | Age: 40
End: 2025-07-11
Payer: COMMERCIAL

## 2025-07-11 DIAGNOSIS — M54.16 RADICULOPATHY, LUMBAR REGION: ICD-10-CM

## 2025-07-11 PROCEDURE — 64483 NJX AA&/STRD TFRM EPI L/S 1: CPT | Performed by: STUDENT IN AN ORGANIZED HEALTH CARE EDUCATION/TRAINING PROGRAM

## 2025-07-11 NOTE — PROGRESS NOTES
Procedure Note: 2025    PROCEDURE NAME: Bilateral lumbar transforaminal epidural steroid injection at L5/S1    Patient Information: Jazz Ji MRN (from [facility]) [id number], : 1985  Chief Complaint: Low back and leg pain    Pain Diagnosis: The diagnosis of Radiculopathy, lumbar region  has been made given the patient's clinical evaluation at prior evaluation.      DESCRIPTION OF PROCEDURE:    Jazz Ji was brought to the procedure room. The assistant obtained vital signs, which were found to be stable. She was then informed of the procedure, its benefits, and its risks, and her questions were answered regarding the procedure. Written informed consent was obtained from the patient. Immediately prior to starting the procedure, a time-out safety check was conducted and the patient's identification, procedure name, and procedure site were confirmed with the patient.    Bilateral L5/S1 Transforaminal Epidural Steroid Injection  After informed written consent was obtained, the patient was placed in prone position with a pillow under the abdomen to reduce lumbar lordosis. Monitoring of pulse oximetry, heart rate, and blood pressure was done pre-procedure, and post-procedure. During the procedure the patient was monitored with continuous pulse-ox. A time out was performed before the beginning of the procedure. The correct site and laterality were marked. The skin was prepped with chlorhexidine, allowed to dry for a minimum of 3 minutes, and draped in a sterile fashion     The L5/S1 vertebral level was identified with use of fluoroscopy in AP and oblique views. The fluoroscope was obliqued to the right to visualize the neuroforamen and the target in the superior foramen just lateral the inferior articular process was identified and marked. The skin and subcutaneous tissue over this site was anesthetized using 3 ml of 1% lidocaine with a 1.5 inch 25G needle. A 3.5 inch 22G spinal needle was then slowly  advanced co-axially until the tip just entered the foramen. Needle position was confirmed using AP and lateral views as it entered the neuroforamen in a supraneural approach. After negative aspiration for blood or CSF, 1 ml of Omnipaque was injected under live fluoroscopy, with contrast found to spread medially into the epidural space. Next, 2 mL of a mixture of 15 mg dexamethasone diluted in 2.5 mL of 1% lidocaine was injected incrementally at each level. The stylet was replaced and the needle was removed    This was then repeated at the L5/S1 level on the left side in a similar fashion      Anesthesia: local anesthesia, IV sedation  Complications: none      Job Amaral MD

## 2025-07-19 DIAGNOSIS — M51.360 DISCOGENIC LUMBAR PAIN: ICD-10-CM

## 2025-07-19 DIAGNOSIS — M54.16 LUMBAR RADICULOPATHY: ICD-10-CM

## 2025-07-21 RX ORDER — MELOXICAM 15 MG/1
15 TABLET ORAL DAILY
Qty: 30 TABLET | Refills: 1 | Status: SHIPPED | OUTPATIENT
Start: 2025-07-21

## 2025-07-29 ENCOUNTER — OFFICE VISIT (OUTPATIENT)
Dept: PAIN MEDICINE | Facility: CLINIC | Age: 40
End: 2025-07-29
Payer: COMMERCIAL

## 2025-07-29 VITALS
HEART RATE: 84 BPM | HEIGHT: 64 IN | SYSTOLIC BLOOD PRESSURE: 129 MMHG | DIASTOLIC BLOOD PRESSURE: 86 MMHG | OXYGEN SATURATION: 98 % | WEIGHT: 160 LBS | BODY MASS INDEX: 27.31 KG/M2

## 2025-07-29 DIAGNOSIS — M54.16 LUMBAR RADICULOPATHY: ICD-10-CM

## 2025-07-29 DIAGNOSIS — M51.360 DISCOGENIC LUMBAR PAIN: ICD-10-CM

## 2025-07-29 DIAGNOSIS — M62.830 SPASM OF MUSCLE OF LOWER BACK: Primary | ICD-10-CM

## 2025-07-29 DIAGNOSIS — G89.29 ACUTE ON CHRONIC LOW BACK PAIN: ICD-10-CM

## 2025-07-29 DIAGNOSIS — M54.50 ACUTE ON CHRONIC LOW BACK PAIN: ICD-10-CM

## 2025-07-29 PROCEDURE — 3008F BODY MASS INDEX DOCD: CPT | Performed by: PHYSICIAN ASSISTANT

## 2025-07-29 PROCEDURE — 99214 OFFICE O/P EST MOD 30 MIN: CPT | Performed by: PHYSICIAN ASSISTANT

## 2025-07-29 RX ORDER — METHOCARBAMOL 500 MG/1
500 TABLET, FILM COATED ORAL 2 TIMES DAILY PRN
Qty: 60 TABLET | Refills: 0 | Status: SHIPPED | OUTPATIENT
Start: 2025-07-29 | End: 2025-08-28

## 2025-07-29 ASSESSMENT — PAIN SCALES - GENERAL
PAINLEVEL_OUTOF10: 9
PAINLEVEL_OUTOF10: 9

## 2025-07-29 ASSESSMENT — PAIN - FUNCTIONAL ASSESSMENT: PAIN_FUNCTIONAL_ASSESSMENT: 0-10

## 2025-07-29 ASSESSMENT — PAIN DESCRIPTION - DESCRIPTORS: DESCRIPTORS: ACHING
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
## 2025-08-03 ASSESSMENT — ENCOUNTER SYMPTOMS
CONSTITUTIONAL NEGATIVE: 1
ENDOCRINE NEGATIVE: 1
CARDIOVASCULAR NEGATIVE: 1
HEMATOLOGIC/LYMPHATIC NEGATIVE: 1
GASTROINTESTINAL NEGATIVE: 1
ALLERGIC/IMMUNOLOGIC NEGATIVE: 1
EYES NEGATIVE: 1
RESPIRATORY NEGATIVE: 1
BACK PAIN: 1
NEUROLOGICAL NEGATIVE: 1
PSYCHIATRIC NEGATIVE: 1

## 2025-08-07 ENCOUNTER — TELEPHONE (OUTPATIENT)
Dept: OBSTETRICS AND GYNECOLOGY | Facility: CLINIC | Age: 40
End: 2025-08-07
Payer: COMMERCIAL

## 2025-08-07 NOTE — TELEPHONE ENCOUNTER
Spoke to pt about her concerns for spotting. Had sent message to Adry Mckay who states it may be due to her adenomyosis...  She states they have an upcoming appointment and they will discuss at that time. Pt notified and receptive

## 2025-08-11 ENCOUNTER — APPOINTMENT (OUTPATIENT)
Dept: OBSTETRICS AND GYNECOLOGY | Facility: CLINIC | Age: 40
End: 2025-08-11
Payer: COMMERCIAL

## 2025-08-11 VITALS
BODY MASS INDEX: 27.31 KG/M2 | WEIGHT: 160 LBS | SYSTOLIC BLOOD PRESSURE: 136 MMHG | HEIGHT: 64 IN | DIASTOLIC BLOOD PRESSURE: 90 MMHG

## 2025-08-11 DIAGNOSIS — L30.9 DERMATITIS: ICD-10-CM

## 2025-08-11 DIAGNOSIS — D21.9 FIBROIDS: Primary | ICD-10-CM

## 2025-08-11 PROCEDURE — 3008F BODY MASS INDEX DOCD: CPT

## 2025-08-11 PROCEDURE — 1036F TOBACCO NON-USER: CPT

## 2025-08-11 PROCEDURE — 99212 OFFICE O/P EST SF 10 MIN: CPT

## 2025-08-11 RX ORDER — BENZOYL PEROXIDE 100 MG/ML
LIQUID TOPICAL
Qty: 237 ML | Refills: 2 | Status: SHIPPED | OUTPATIENT
Start: 2025-08-11

## 2025-08-11 RX ORDER — BENZOYL PEROXIDE 100 MG/ML
LIQUID TOPICAL
Qty: 237 ML | Refills: 2 | Status: SHIPPED | OUTPATIENT
Start: 2025-08-11 | End: 2025-08-11 | Stop reason: SDUPTHER

## 2025-08-11 ASSESSMENT — PAIN SCALES - GENERAL: PAINLEVEL_OUTOF10: 0-NO PAIN

## 2025-08-13 DIAGNOSIS — N89.8 VAGINAL DRYNESS: Primary | ICD-10-CM

## 2025-08-13 RX ORDER — ESTRADIOL 0.1 MG/G
CREAM VAGINAL
Qty: 42.5 G | Refills: 3 | Status: SHIPPED | OUTPATIENT
Start: 2025-08-13

## 2025-08-25 DIAGNOSIS — G43.709 CHRONIC MIGRAINE WITHOUT AURA WITHOUT STATUS MIGRAINOSUS, NOT INTRACTABLE: ICD-10-CM

## 2025-08-25 DIAGNOSIS — E55.9 VITAMIN D DEFICIENCY: ICD-10-CM

## 2025-08-25 DIAGNOSIS — L30.9 DERMATITIS: ICD-10-CM

## 2025-08-25 DIAGNOSIS — R79.89 ELEVATED VITAMIN B12 LEVEL: ICD-10-CM

## 2025-08-29 ENCOUNTER — APPOINTMENT (OUTPATIENT)
Dept: OBSTETRICS AND GYNECOLOGY | Facility: CLINIC | Age: 40
End: 2025-08-29
Payer: COMMERCIAL

## 2025-12-10 ENCOUNTER — APPOINTMENT (OUTPATIENT)
Dept: OBSTETRICS AND GYNECOLOGY | Facility: CLINIC | Age: 40
End: 2025-12-10
Payer: COMMERCIAL